# Patient Record
Sex: MALE | Race: WHITE | Employment: UNEMPLOYED | URBAN - METROPOLITAN AREA
[De-identification: names, ages, dates, MRNs, and addresses within clinical notes are randomized per-mention and may not be internally consistent; named-entity substitution may affect disease eponyms.]

---

## 2017-01-19 ENCOUNTER — OFFICE VISIT (OUTPATIENT)
Dept: FAMILY MEDICINE CLINIC | Age: 37
End: 2017-01-19

## 2017-01-19 VITALS
DIASTOLIC BLOOD PRESSURE: 85 MMHG | SYSTOLIC BLOOD PRESSURE: 127 MMHG | TEMPERATURE: 98.9 F | HEART RATE: 90 BPM | WEIGHT: 220 LBS | BODY MASS INDEX: 29.8 KG/M2 | HEIGHT: 72 IN | OXYGEN SATURATION: 98 % | RESPIRATION RATE: 12 BRPM

## 2017-01-19 DIAGNOSIS — E78.2 MIXED HYPERCHOLESTEROLEMIA AND HYPERTRIGLYCERIDEMIA: Primary | ICD-10-CM

## 2017-01-19 RX ORDER — DOXEPIN HYDROCHLORIDE 25 MG/1
CAPSULE ORAL
Status: ON HOLD | COMMUNITY
Start: 2017-01-09 | End: 2017-10-12

## 2017-01-19 RX ORDER — SIMVASTATIN 20 MG/1
20 TABLET, FILM COATED ORAL
Qty: 30 TAB | Refills: 2 | Status: ON HOLD | OUTPATIENT
Start: 2017-01-19 | End: 2017-10-12

## 2017-01-19 RX ORDER — DOXEPIN HYDROCHLORIDE 10 MG/1
CAPSULE ORAL
COMMUNITY
End: 2017-01-19 | Stop reason: DRUGHIGH

## 2017-01-19 NOTE — PROGRESS NOTES
Chief Complaint   Patient presents with   [address] Cholesterol Problem     he is a 31y.o. year old male who presents for evalution. Pt here for lab follow-up, had labs drawn by psychiatry and showed elevated cholesterol. Pt states diet is not very healthy, eats mostly cheesburgers, pizza, or pasta. Pt drinks mostly sweet tea or water. Does not exercise regularly. Reviewed PmHx, RxHx, FmHx, SocHx, AllgHx and updated and dated in the chart. Review of Systems - negative except as listed above in the HPI    Objective:     Vitals:    01/19/17 1453   BP: 127/85   Pulse: 90   Resp: 12   Temp: 98.9 °F (37.2 °C)   SpO2: 98%   Weight: 220 lb (99.8 kg)   Height: 6' (1.829 m)     Physical Examination: General appearance - alert, well appearing, and in no distress  Chest - clear to auscultation, no wheezes, rales or rhonchi, symmetric air entry  Heart - normal rate, regular rhythm, normal S1, S2, no murmurs, rubs, clicks or gallops    Assessment/ Plan:   Anel Bridges was seen today for labs and cholesterol problem. Diagnoses and all orders for this visit:    Mixed hypercholesterolemia and hypertriglyceridemia  -     simvastatin (ZOCOR) 20 mg tablet; Take 1 Tab by mouth nightly. Discussed need for low fat diet, rich in fruits and vegetables. Encouraged regular meals and daily exercise of at least 20 minutes. Reviewed sequela of high cholesterol on heart and brain health. Continue current medications. F/U 3 mo. Pt voiced understanding regarding plan of care. Follow-up Disposition:  Return in about 3 months (around 4/19/2017) for fasting appt cholesterol . I have discussed the diagnosis with the patient and the intended plan as seen in the above orders. The patient has received an after-visit summary and questions were answered concerning future plans.      Medication Side Effects and Warnings were discussed with patient    Nieves James NP

## 2017-01-19 NOTE — PATIENT INSTRUCTIONS
Hyperlipidemia: After Your Visit  Your Care Instructions  Hyperlipidemia is too much fat in your blood. The body has several kinds of fat, including cholesterol and triglycerides. Your body needs fat for many things, such as making new cells. But too much fat in your blood increases your chances of having a heart attack or stroke. You may be able to lower your cholesterol and triglycerides with a heart-healthy diet, exercise, and if needed, medicine. Your doctor may want you to try lifestyle changes first to see whether they lower the fat in your blood. You may need to take medicine if lifestyle changes do not lower the fat in your blood enough. Follow-up care is a key part of your treatment and safety. Be sure to make and go to all appointments, and call your doctor if you are having problems. Its also a good idea to know your test results and keep a list of the medicines you take. How can you care for yourself at home? Take your medicines  · Take your medicines exactly as prescribed. Call your doctor if you think you are having a problem with your medicine. · If you take medicine to lower your cholesterol, go to follow-up visits. You will need to have blood tests. · Do not take large doses of niacin, which is a B vitamin, while taking medicine called statins. It may increase the chance of muscle pain and liver problems. · Talk to your doctor about avoiding grapefruit juice if you are taking statins. Grapefruit juice can raise the level of this medicine in your blood. This could increase side effects. Eat more fruits, vegetables, and fiber  · Fruits and vegetables have lots of nutrients that help protect against heart disease, and they have little--if any--fat. Try to eat at least five servings a day. Dark green, deep orange, or yellow fruits and vegetables are healthy choices. · Keep carrots, celery, and other veggies handy for snacks.  Buy fruit that is in season and store it where you can see it so that you will be tempted to eat it. Cook dishes that have a lot of veggies in them, such as stir-fries and soups. · Foods high in fiber may reduce your cholesterol and provide important vitamins and minerals. High-fiber foods include whole-grain cereals and breads, oatmeal, beans, brown rice, citrus fruits, and apples. · Buy whole-grain breads and cereals instead of white bread and pastries. Limit saturated fat  · Read food labels and try to avoid saturated fat and trans fat. They increase your risk of heart disease. · Use olive or canola oil when you cook. Try cholesterol-lowering spreads, such as Benecol or Take Control. · Bake, broil, grill, or steam foods instead of frying them. · Limit the amount of high-fat meats you eat, including hot dogs and sausages. Cut out all visible fat when you prepare meat. · Eat fish, skinless poultry, and soy products such as tofu instead of high-fat meats. Soybeans may be especially good for your heart. Eat at least two servings of fish a week. Certain fish, such as salmon, contain omega-3 fatty acids, which may help reduce your risk of heart attack. · Choose low-fat or fat-free milk and dairy products. Get exercise, limit alcohol, and quit smoking  · Get more exercise. Work with your doctor to set up an exercise program. Even if you can do only a small amount, exercise will help you get stronger, have more energy, and manage your weight and your stress. Walking is an easy way to get exercise. Gradually increase the amount you walk every day. Aim for at least 30 minutes on most days of the week. You also may want to swim, bike, or do other activities. · Limit alcohol to no more than 2 drinks a day for men and 1 drink a day for women. · Do not smoke. If you need help quitting, talk to your doctor about stop-smoking programs and medicines. These can increase your chances of quitting for good. When should you call for help?   Call 911 anytime you think you may need emergency care. For example, call if:  · You have symptoms of a heart attack. These may include:  ¨ Chest pain or pressure, or a strange feeling in the chest.  ¨ Sweating. ¨ Shortness of breath. ¨ Nausea or vomiting. ¨ Pain, pressure, or a strange feeling in the back, neck, jaw, or upper belly or in one or both shoulders or arms. ¨ Lightheadedness or sudden weakness. ¨ A fast or irregular heartbeat. After you call 911, the  may tell you to chew 1 adult-strength or 2 to 4 low-dose aspirin. Wait for an ambulance. Do not try to drive yourself. · You have signs of a stroke. These may include:  ¨ Sudden numbness, paralysis, or weakness in your face, arm, or leg, especially on only one side of your body. ¨ New problems with walking or balance. ¨ Sudden vision changes. ¨ Drooling or slurred speech. ¨ New problems speaking or understanding simple statements, or feeling confused. ¨ A sudden, severe headache that is different from past headaches. · You passed out (lost consciousness). Call your doctor now or seek immediate medical care if:  · You have muscle pain or weakness. Watch closely for changes in your health, and be sure to contact your doctor if:  · You are very tired. · You have an upset stomach, gas, constipation, or belly pain or cramps. Where can you learn more? Go to [employer]  Enter C406 in the search box to learn more about \"Hyperlipidemia: After Your Visit. \"   © 5286-7736 Healthwise, Incorporated. Care instructions adapted under license by [employer] (which disclaims liability or warranty for this information). This care instruction is for use with your licensed healthcare professional. If you have questions about a medical condition or this instruction, always ask your healthcare professional. [place] [address] any warranty or liability for your use of this information.   Content Version: 1.9.613234; Last Revised: October 13, 2011

## 2017-01-19 NOTE — MR AVS SNAPSHOT
Visit Information Date & Time Provider Department Dept. Phone Encounter #  
 1/19/2017  2:45 PM Matthew Askew, [facility] [address] [facility] [phone] [id number] Follow-up Instructions Return in about 3 months (around 4/19/2017) for fasting appt cholesterol . Upcoming Health Maintenance Date Due Pneumococcal 19-64 Medium Risk (1 of 1 - PPSV23) 4/27/2001 DTaP/Tdap/Td series (1 - Tdap) 4/27/2003 INFLUENZA AGE 9 TO ADULT 8/1/2016 Allergies as of 1/19/2017  Review Complete On: 1/19/2017 By: Daniel Mendez LPN Severity Noted Reaction Type Reactions Bee Sting [Sting, Bee]  12/05/2016    Swelling Haldol [Haloperidol Lactate]  12/05/2016    Other (comments) Muscle spasms Pcn [Penicillins]  12/05/2016    Swelling Shellfish Derived  12/05/2016    Other (comments) Tingling in mouth Current Immunizations  Never Reviewed No immunizations on file. Not reviewed this visit You Were Diagnosed With   
  
 Codes Comments Mixed hypercholesterolemia and hypertriglyceridemia    -  Primary ICD-10-CM: J90.3 ICD-9-CM: 272.2 Vitals BP Pulse Temp Resp Height(growth percentile) Weight(growth percentile) 127/85 90 98.9 °F (37.2 °C) 12 6' (1.829 m) 220 lb (99.8 kg) SpO2 BMI Smoking Status 98% 29.84 kg/m2 Current Every Day Smoker Vitals History BMI and BSA Data Body Mass Index Body Surface Area  
 29.84 kg/m 2 2.25 m 2 Preferred Pharmacy Pharmacy Name Phone [id number], [address] [facility] [phone] Your Updated Medication List  
  
   
This list is accurate as of: 1/19/17  3:17 PM.  Always use your most recent med list.  
  
  
  
  
 doxepin 10 mg capsule Commonly known as:  SINEquan Take  by mouth nightly. melatonin 3 mg tablet Take  by mouth. * OLANZapine 10 mg tablet Commonly known as:  ZyPREXA Take 10 mg by mouth every morning. * OLANZapine 15 mg tablet Commonly known as:  ZyPREXA Take 15 mg by mouth nightly. simvastatin 20 mg tablet Commonly known as:  ZOCOR Take 1 Tab by mouth nightly. traZODone 100 mg tablet Commonly known as:  Anjel Oh Take 100 mg by mouth nightly. * Notice: This list has 2 medication(s) that are the same as other medications prescribed for you. Read the directions carefully, and ask your doctor or other care provider to review them with you. Prescriptions Sent to Pharmacy Refills  
 simvastatin (ZOCOR) 20 mg tablet 2 Sig: Take 1 Tab by mouth nightly. Class: Normal  
 Pharmacy: [id number] [place], [address] [facility] #: [phone] Route: Oral  
  
Follow-up Instructions Return in about 3 months (around 4/19/2017) for fasting appt cholesterol . Patient Instructions Hyperlipidemia: After Your Visit Your Care Instructions Hyperlipidemia is too much fat in your blood. The body has several kinds of fat, including cholesterol and triglycerides. Your body needs fat for many things, such as making new cells. But too much fat in your blood increases your chances of having a heart attack or stroke. You may be able to lower your cholesterol and triglycerides with a heart-healthy diet, exercise, and if needed, medicine. Your doctor may want you to try lifestyle changes first to see whether they lower the fat in your blood. You may need to take medicine if lifestyle changes do not lower the fat in your blood enough. Follow-up care is a key part of your treatment and safety. Be sure to make and go to all appointments, and call your doctor if you are having problems. Its also a good idea to know your test results and keep a list of the medicines you take. How can you care for yourself at home? Take your medicines · Take your medicines exactly as prescribed. Call your doctor if you think you are having a problem with your medicine. · If you take medicine to lower your cholesterol, go to follow-up visits. You will need to have blood tests. · Do not take large doses of niacin, which is a B vitamin, while taking medicine called statins. It may increase the chance of muscle pain and liver problems. · Talk to your doctor about avoiding grapefruit juice if you are taking statins. Grapefruit juice can raise the level of this medicine in your blood. This could increase side effects. Eat more fruits, vegetables, and fiber · Fruits and vegetables have lots of nutrients that help protect against heart disease, and they have littleif anyfat. Try to eat at least five servings a day. Dark green, deep orange, or yellow fruits and vegetables are healthy choices. · Keep carrots, celery, and other veggies handy for snacks. Buy fruit that is in season and store it where you can see it so that you will be tempted to eat it. Cook dishes that have a lot of veggies in them, such as stir-fries and soups. · Foods high in fiber may reduce your cholesterol and provide important vitamins and minerals. High-fiber foods include whole-grain cereals and breads, oatmeal, beans, brown rice, citrus fruits, and apples. · Buy whole-grain breads and cereals instead of white bread and pastries. Limit saturated fat · Read food labels and try to avoid saturated fat and trans fat. They increase your risk of heart disease. · Use olive or canola oil when you cook. Try cholesterol-lowering spreads, such as Benecol or Take Control. · Bake, broil, grill, or steam foods instead of frying them. · Limit the amount of high-fat meats you eat, including hot dogs and sausages. Cut out all visible fat when you prepare meat. · Eat fish, skinless poultry, and soy products such as tofu instead of high-fat meats. Soybeans may be especially good for your heart. Eat at least two servings of fish a week.  Certain fish, such as salmon, contain omega-3 fatty acids, which may help reduce your risk of heart attack. · Choose low-fat or fat-free milk and dairy products. Get exercise, limit alcohol, and quit smoking · Get more exercise. Work with your doctor to set up an exercise program. Even if you can do only a small amount, exercise will help you get stronger, have more energy, and manage your weight and your stress. Walking is an easy way to get exercise. Gradually increase the amount you walk every day. Aim for at least 30 minutes on most days of the week. You also may want to swim, bike, or do other activities. · Limit alcohol to no more than 2 drinks a day for men and 1 drink a day for women. · Do not smoke. If you need help quitting, talk to your doctor about stop-smoking programs and medicines. These can increase your chances of quitting for good. When should you call for help? Call 911 anytime you think you may need emergency care. For example, call if: 
· You have symptoms of a heart attack. These may include: ¨ Chest pain or pressure, or a strange feeling in the chest. 
¨ Sweating. ¨ Shortness of breath. ¨ Nausea or vomiting. ¨ Pain, pressure, or a strange feeling in the back, neck, jaw, or upper belly or in one or both shoulders or arms. ¨ Lightheadedness or sudden weakness. ¨ A fast or irregular heartbeat. After you call 911, the  may tell you to chew 1 adult-strength or 2 to 4 low-dose aspirin. Wait for an ambulance. Do not try to drive yourself. · You have signs of a stroke. These may include: 
¨ Sudden numbness, paralysis, or weakness in your face, arm, or leg, especially on only one side of your body. ¨ New problems with walking or balance. ¨ Sudden vision changes. ¨ Drooling or slurred speech. ¨ New problems speaking or understanding simple statements, or feeling confused. ¨ A sudden, severe headache that is different from past headaches. · You passed out (lost consciousness). Call your doctor now or seek immediate medical care if: 
· You have muscle pain or weakness. Watch closely for changes in your health, and be sure to contact your doctor if: 
· You are very tired. · You have an upset stomach, gas, constipation, or belly pain or cramps. Where can you learn more? Go to [employer] Enter C406 in the search box to learn more about \"Hyperlipidemia: After Your Visit. \"  
© 3435-7249 Healthwise, Incorporated. Care instructions adapted under license by [employer] (which disclaims liability or warranty for this information). This care instruction is for use with your licensed healthcare professional. If you have questions about a medical condition or this instruction, always ask your healthcare professional. [address] any warranty or liability for your use of this information. Content Version: 0.8.738655; Last Revised: October 13, 2011 Introducing [facility] SERVICES! [employer] introduces [employer] patient portal. Now you can access parts of your medical record, email your doctor's office, and request medication refills online. 1. In your internet browser, go to [email or web address] 2. Click on the First Time User? Click Here link in the Sign In box. You will see the New Member Sign Up page. 3. Enter your [employer] Access Code exactly as it appears below. You will not need to use this code after youve completed the sign-up process. If you do not sign up before the expiration date, you must request a new code. · [employer] Access Code: [email or web address] Expires: 3/5/2017  2:26 PM 
 
4. Enter the last four digits of your Social Security Number (xxxx) and Date of Birth (mm/dd/yyyy) as indicated and click Submit. You will be taken to the next sign-up page. 5. Create a [employer] ID. This will be your [employer] login ID and cannot be changed, so think of one that is secure and easy to remember. 6. Create a [employer] password. You can change your password at any time. 7. Enter your Password Reset Question and Answer. This can be used at a later time if you forget your password. 8. Enter your e-mail address. You will receive e-mail notification when new information is available in [address] 9. Click Sign Up. You can now view and download portions of your medical record. 10. Click the Download Summary menu link to download a portable copy of your medical information. If you have questions, please visit the Frequently Asked Questions section of the [employer] website. Remember, [employer] is NOT to be used for urgent needs. For medical emergencies, dial 911. Now available from your iPhone and Android! Please provide this summary of care documentation to your next provider. Your primary care clinician is listed as Gustavo Busby. If you have any questions after today's visit, please call [phone].

## 2017-10-11 ENCOUNTER — APPOINTMENT (OUTPATIENT)
Dept: GENERAL RADIOLOGY | Age: 37
DRG: 753 | End: 2017-10-11
Attending: PHYSICIAN ASSISTANT
Payer: MEDICAID

## 2017-10-11 ENCOUNTER — HOSPITAL ENCOUNTER (INPATIENT)
Age: 37
LOS: 23 days | Discharge: HOME | DRG: 753 | End: 2017-11-03
Attending: EMERGENCY MEDICINE | Admitting: PSYCHIATRY & NEUROLOGY
Payer: MEDICAID

## 2017-10-11 DIAGNOSIS — F25.9 SCHIZOAFFECTIVE DISORDER, UNSPECIFIED TYPE (HCC): Primary | ICD-10-CM

## 2017-10-11 LAB
ALBUMIN SERPL-MCNC: 3.5 G/DL (ref 3.5–5)
ALBUMIN/GLOB SERPL: 1 {RATIO} (ref 1.1–2.2)
ALP SERPL-CCNC: 81 U/L (ref 45–117)
ALT SERPL-CCNC: 30 U/L (ref 12–78)
AMPHET UR QL SCN: NEGATIVE
ANION GAP SERPL CALC-SCNC: 9 MMOL/L (ref 5–15)
APAP SERPL-MCNC: <2 UG/ML (ref 10–30)
APPEARANCE UR: CLEAR
AST SERPL-CCNC: 22 U/L (ref 15–37)
BACTERIA URNS QL MICRO: NEGATIVE /HPF
BARBITURATES UR QL SCN: NEGATIVE
BASOPHILS # BLD: 0.1 K/UL (ref 0–0.1)
BASOPHILS NFR BLD: 1 % (ref 0–1)
BENZODIAZ UR QL: NEGATIVE
BILIRUB SERPL-MCNC: 0.3 MG/DL (ref 0.2–1)
BILIRUB UR QL: NEGATIVE
BUN SERPL-MCNC: 15 MG/DL (ref 6–20)
BUN/CREAT SERPL: 16 (ref 12–20)
CALCIUM SERPL-MCNC: 8.5 MG/DL (ref 8.5–10.1)
CANNABINOIDS UR QL SCN: NEGATIVE
CHLORIDE SERPL-SCNC: 108 MMOL/L (ref 97–108)
CO2 SERPL-SCNC: 25 MMOL/L (ref 21–32)
COCAINE UR QL SCN: NEGATIVE
COLOR UR: NORMAL
CREAT SERPL-MCNC: 0.92 MG/DL (ref 0.7–1.3)
DRUG SCRN COMMENT,DRGCM: NORMAL
EOSINOPHIL # BLD: 0.3 K/UL (ref 0–0.4)
EOSINOPHIL NFR BLD: 2 % (ref 0–7)
EPITH CASTS URNS QL MICRO: NORMAL /LPF
ERYTHROCYTE [DISTWIDTH] IN BLOOD BY AUTOMATED COUNT: 13.5 % (ref 11.5–14.5)
ETHANOL SERPL-MCNC: <10 MG/DL
GLOBULIN SER CALC-MCNC: 3.5 G/DL (ref 2–4)
GLUCOSE SERPL-MCNC: 102 MG/DL (ref 65–100)
GLUCOSE UR STRIP.AUTO-MCNC: NEGATIVE MG/DL
HCT VFR BLD AUTO: 42.9 % (ref 36.6–50.3)
HGB BLD-MCNC: 15.2 G/DL (ref 12.1–17)
HGB UR QL STRIP: NEGATIVE
HYALINE CASTS URNS QL MICRO: NORMAL /LPF (ref 0–5)
KETONES UR QL STRIP.AUTO: NEGATIVE MG/DL
LEUKOCYTE ESTERASE UR QL STRIP.AUTO: NEGATIVE
LYMPHOCYTES # BLD: 1.9 K/UL (ref 0.8–3.5)
LYMPHOCYTES NFR BLD: 18 % (ref 12–49)
MCH RBC QN AUTO: 30.9 PG (ref 26–34)
MCHC RBC AUTO-ENTMCNC: 35.4 G/DL (ref 30–36.5)
MCV RBC AUTO: 87.2 FL (ref 80–99)
METHADONE UR QL: NEGATIVE
MONOCYTES # BLD: 0.8 K/UL (ref 0–1)
MONOCYTES NFR BLD: 7 % (ref 5–13)
NEUTS SEG # BLD: 8 K/UL (ref 1.8–8)
NEUTS SEG NFR BLD: 72 % (ref 32–75)
NITRITE UR QL STRIP.AUTO: NEGATIVE
OPIATES UR QL: NEGATIVE
PCP UR QL: NEGATIVE
PH UR STRIP: 6.5 [PH] (ref 5–8)
PLATELET # BLD AUTO: 222 K/UL (ref 150–400)
POTASSIUM SERPL-SCNC: 4 MMOL/L (ref 3.5–5.1)
PROT SERPL-MCNC: 7 G/DL (ref 6.4–8.2)
PROT UR STRIP-MCNC: NEGATIVE MG/DL
RBC # BLD AUTO: 4.92 M/UL (ref 4.1–5.7)
RBC #/AREA URNS HPF: NORMAL /HPF (ref 0–5)
SALICYLATES SERPL-MCNC: <1.7 MG/DL (ref 2.8–20)
SODIUM SERPL-SCNC: 142 MMOL/L (ref 136–145)
SP GR UR REFRACTOMETRY: 1.03 (ref 1–1.03)
UR CULT HOLD, URHOLD: NORMAL
UROBILINOGEN UR QL STRIP.AUTO: 1 EU/DL (ref 0.2–1)
WBC # BLD AUTO: 11 K/UL (ref 4.1–11.1)
WBC URNS QL MICRO: NORMAL /HPF (ref 0–4)

## 2017-10-11 PROCEDURE — 80307 DRUG TEST PRSMV CHEM ANLYZR: CPT | Performed by: PHYSICIAN ASSISTANT

## 2017-10-11 PROCEDURE — 85025 COMPLETE CBC W/AUTO DIFF WBC: CPT | Performed by: EMERGENCY MEDICINE

## 2017-10-11 PROCEDURE — 65220000003 HC RM SEMIPRIVATE PSYCH

## 2017-10-11 PROCEDURE — 80053 COMPREHEN METABOLIC PANEL: CPT | Performed by: EMERGENCY MEDICINE

## 2017-10-11 PROCEDURE — 73630 X-RAY EXAM OF FOOT: CPT

## 2017-10-11 PROCEDURE — 99284 EMERGENCY DEPT VISIT MOD MDM: CPT

## 2017-10-11 PROCEDURE — 36415 COLL VENOUS BLD VENIPUNCTURE: CPT | Performed by: EMERGENCY MEDICINE

## 2017-10-11 PROCEDURE — 80307 DRUG TEST PRSMV CHEM ANLYZR: CPT | Performed by: EMERGENCY MEDICINE

## 2017-10-11 PROCEDURE — 81001 URINALYSIS AUTO W/SCOPE: CPT | Performed by: EMERGENCY MEDICINE

## 2017-10-11 PROCEDURE — 74011250637 HC RX REV CODE- 250/637: Performed by: PHYSICIAN ASSISTANT

## 2017-10-11 RX ORDER — CEPHALEXIN 500 MG/1
500 CAPSULE ORAL
Status: COMPLETED | OUTPATIENT
Start: 2017-10-11 | End: 2017-10-11

## 2017-10-11 RX ADMIN — CEPHALEXIN 500 MG: 500 CAPSULE ORAL at 18:00

## 2017-10-11 NOTE — IP AVS SNAPSHOT
[address] [facility] [address] [place] [address] 
[phone] Patient: Hodan Gomez MRN: [id number] URE:8/78/6128 About your hospitalization You were admitted on:  October 11, 2017 You last received care in the:  [facility] You were discharged on:  November 3, 2017 Why you were hospitalized Your primary diagnosis was:  Schizoaffective Disorder (Hcc) Your diagnoses also included:  Aggression Things You Need To Do (next 8 weeks) Follow up with Pt. received fluphenazine decanoate IM injection - 50mg on 10/27 and 25mg on 11/1 Recommend maintanence dose of fluphenazine 75mg Follow up with Rosaline Jimenes NP Phone:  [phone] Where:  [address], [address] [facility], [id number] [place] [address] Friday Nov 03, 2017 Follow up with [address] [place] [address]  
your  will  and transport home at 11:00 Phone:  [phone] Where:  [address] [address] [address] Wednesday Nov 08, 2017 Follow up with [address] [place] [address]  
you have a 10:30 appointment with Dr. Marline Sainz   
  
Phone:  [phone] Where:  [facility] [address] [place] [address] Discharge Orders None A check santiago indicates which time of day the medication should be taken. My Medications TAKE these medications as instructed Instructions Each Dose to Equal  
 Morning Noon Evening Bedtime diphenhydrAMINE 25 mg capsule Commonly known as:  BENADRYL Your last dose was:  today Take 1 Cap by mouth two (2) times a day for 10 days. Indications: extrapyramidal disease 25 mg  
    
   
   
   
  
  
 OLANZapine 10 mg disintegrating tablet Commonly known as:  ZyPREXA zydis Your last dose was:  today Take 1 Tab by mouth three (3) times daily. Indications: Schizophrenia  10 mg  
    
   
   
  
   
  
  
 ASK your physician about these medications Instructions Each Dose to Equal  
 Morning Noon Evening Bedtime  
 fluPHENAZine decanoate 25 mg/mL injection Commonly known as:  PROLIXIN Ask about: Should I take this medication? Your last dose was:   Your next dose is: Follow-up with your psychiatrist for information on the next dose. 3 mL by IntraMUSCular route once for 1 dose. Indications: Psychotic Disorder 75 mg Where to Get Your Medications Information on where to get these meds will be given to you by the nurse or doctor. ! Ask your nurse or doctor about these medications diphenhydrAMINE 25 mg capsule  
 fluPHENAZine decanoate 25 mg/mL injection OLANZapine 10 mg disintegrating tablet Discharge Instructions DISCHARGE SUMMARY 
 
NAME:Jermain Lara : 1980 MRN: [id number] The patient Yesi Lucas exhibits the ability to control behavior in a less restrictive environment. Patient's level of functioning is improving. No assaultive/destructive behavior has been observed for the past 24 hours. No suicidal/homicidal threat or behavior has been observed for the past 24 hours. There is no evidence of serious medication side effects. Patient has not been in physical or protective restraints for at least the past 24 hours. If weapons involved, how are they secured? No weapons involved Is patient aware of and in agreement with discharge plan? Patient is aware of discharge and is in agreement. Arrangements for medication:  Prescriptions given to patient. Copy of discharge instructions to  provider?:  Yes, fax transition record to [phone] Arrangements for transportation home: Your  will  at 11:00 Keep all follow up appointments as scheduled, continue to take prescribed medications per physician instructions. Mental health crisis number:  048 or your local mental health crisis line number at [phone] DISCHARGE SUMMARY from Nurse PATIENT INSTRUCTIONS: 
 
 
What to do at Home: 
Recommended activity: Activity as tolerated If you experience any of the following symptoms thoughts of harming yourself or others, please follow up with  911 or your local mental health crisis line number at [phone] *  Please give a list of your current medications to your Primary Care Provider. *  Please update this list whenever your medications are discontinued, doses are 
    changed, or new medications (including over-the-counter products) are added. *  Please carry medication information at all times in case of emergency situations. These are general instructions for a healthy lifestyle: No smoking/ No tobacco products/ Avoid exposure to second hand smoke Surgeon General's Warning:  Quitting smoking now greatly reduces serious risk to your health. Obesity, smoking, and sedentary lifestyle greatly increases your risk for illness A healthy diet, regular physical exercise & weight monitoring are important for maintaining a healthy lifestyle You may be retaining fluid if you have a history of heart failure or if you experience any of the following symptoms:  Weight gain of 3 pounds or more overnight or 5 pounds in a week, increased swelling in our hands or feet or shortness of breath while lying flat in bed. Please call your doctor as soon as you notice any of these symptoms; do not wait until your next office visit. Recognize signs and symptoms of STROKE: 
 
F-face looks uneven A-arms unable to move or move unevenly S-speech slurred or non-existent T-time-call 911 as soon as signs and symptoms begin-DO NOT go Back to bed or wait to see if you get better-TIME IS BRAIN. Warning Signs of HEART ATTACK Call 911 if you have these symptoms: ? Chest discomfort. Most heart attacks involve discomfort in the center of the chest that lasts more than a few minutes, or that goes away and comes back. It can feel like uncomfortable pressure, squeezing, fullness, or pain. ? Discomfort in other areas of the upper body. Symptoms can include pain or discomfort in one or both arms, the back, neck, jaw, or stomach. ? Shortness of breath with or without chest discomfort. ? Other signs may include breaking out in a cold sweat, nausea, or lightheadedness. Don't wait more than five minutes to call [address]! Fast action can save your life. Calling 911 is almost always the fastest way to get lifesaving treatment. Emergency Medical Services staff can begin treatment when they arrive  up to an hour sooner than if someone gets to the hospital by car. The discharge information has been reviewed with the patient. The patient verbalized understanding. Discharge medications reviewed with the patient and appropriate educational materials and side effects teaching were provided. ___________________________________________________________________________________________________________________________________ [employer] Announcement We are excited to announce that we are making your provider's discharge notes available to you in [employer]. You will see these notes when they are completed and signed by the physician that discharged you from your recent hospital stay. If you have any questions or concerns about any information you see in [employer], please call the Health Information Department where you were seen or reach out to your Primary Care Provider for more information about your plan of care. Introducing [facility]! Twila Platt introduces [employer]2Manage patient portal. Now you can access parts of your medical record, email your doctor's office, and request medication refills online.    
 
1. In your internet browser, go to [email or web address] 2. Click on the First Time User? Click Here link in the Sign In box. You will see the New Member Sign Up page. 3. Enter your Global Active Access Code exactly as it appears below. You will not need to use this code after youve completed the sign-up process. If you do not sign up before the expiration date, you must request a new code. · Global Active Access Code: [email or web address] Expires: 2/1/2018  8:24 AM 
 
4. Enter the last four digits of your Social Security Number (xxxx) and Date of Birth (mm/dd/yyyy) as indicated and click Submit. You will be taken to the next sign-up page. 5. Create a Bdayt ID. This will be your Global Active login ID and cannot be changed, so think of one that is secure and easy to remember. 6. Create a Global Active password. You can change your password at any time. 7. Enter your Password Reset Question and Answer. This can be used at a later time if you forget your password. 8. Enter your e-mail address. You will receive e-mail notification when new information is available in [address] 9. Click Sign Up. You can now view and download portions of your medical record. 10. Click the Download Summary menu link to download a portable copy of your medical information. If you have questions, please visit the Frequently Asked Questions section of the Global Active website. Remember, Global Active is NOT to be used for urgent needs. For medical emergencies, dial 911. Now available from your iPhone and Android! Providers Seen During Your Hospitalization Provider Specialty Primary office phone Pete Wise MD Emergency Medicine [phone] Laurel Iyer MD Psychiatry [phone] Your Primary Care Physician (PCP) Primary Care Physician Office Phone Office Fax Pilar Sellers [phone] You are allergic to the following Allergen Reactions Bee Sting (Sting, Bee) Swelling Haldol (Haloperidol Lactate) Other (comments) Muscle spasms Pcn (Penicillins) Swelling Shellfish Derived Other (comments) Tingling in mouth Recent Documentation Height Weight BMI Smoking Status 1.829 m 96.3 kg 28.79 kg/m2 Current Every Day Smoker Emergency Contacts Name Discharge Info Relation Home Work Mobile Emilie MANUEL N/A  AT THIS TIME [6] Parent [1] [phone] HardeepAlvai N/A  AT THIS TIME [6] Parent [1] [phone] Patient Belongings The following personal items are in your possession at time of discharge: 
  Dental Appliances: None  Visual Aid: None      Home Medications: None   Jewelry: None  Clothing: Footwear, Undergarments, Pants, Shirt Please provide this summary of care documentation to your next provider. Signatures-by signing, you are acknowledging that this After Visit Summary has been reviewed with you and you have received a copy. Patient Signature:  ____________________________________________________________ Date:  ____________________________________________________________  
  
Franki Connor Provider Signature:  ____________________________________________________________ Date:  ____________________________________________________________

## 2017-10-11 NOTE — IP AVS SNAPSHOT
[address] 
[phone] Patient: Rehana Ramirez MRN: [id number] ETT:6/48/1772 My Medications TAKE these medications as instructed Instructions Each Dose to Equal  
 Morning Noon Evening Bedtime diphenhydrAMINE 25 mg capsule Commonly known as:  BENADRYL Your last dose was:  today Take 1 Cap by mouth two (2) times a day for 10 days. Indications: extrapyramidal disease 25 mg  
    
   
   
   
  
  
 OLANZapine 10 mg disintegrating tablet Commonly known as:  ZyPREXA zydis Your last dose was:  today Take 1 Tab by mouth three (3) times daily. Indications: Schizophrenia 10 mg ASK your physician about these medications Instructions Each Dose to Equal  
 Morning Noon Evening Bedtime  
 fluPHENAZine decanoate 25 mg/mL injection Commonly known as:  PROLIXIN Ask about: Should I take this medication? Your last dose was:  11/1 Your next dose is: Follow-up with your psychiatrist for information on the next dose. 3 mL by IntraMUSCular route once for 1 dose. Indications: Psychotic Disorder 75 mg Where to Get Your Medications Information on where to get these meds will be given to you by the nurse or doctor. ! Ask your nurse or doctor about these medications diphenhydrAMINE 25 mg capsule  
 fluPHENAZine decanoate 25 mg/mL injection OLANZapine 10 mg disintegrating tablet

## 2017-10-11 NOTE — ED PROVIDER NOTES
HPI Comments: 29 y/o male with PMHx of schizoaffective disorder, asthma and depression, presenting with law enforcement under TDO with complaint of mental health problem. The patient is a poor historian, not cooperating with interview. He does state that his feet have been hurting him for \"a long time,\" is not sure if he fell or twisted his ankles. In response to question of SI/HI, patient will only state \"I like myself. \" He denies substance use or taking any prescribed medications. Law enforcement officers state that they were contacted by the patient's father, who was concerned that the patient was behaving erratically and did not seem to be showering or taking care of himself. The history is provided by the patient and the police. Past Medical History:   Diagnosis Date    Asthma     Depression     Schizoaffective disorder ([facility] [address]        Past Surgical History:   Procedure Laterality Date    HX APPENDECTOMY      HX WISDOM TEETH EXTRACTION           Family History:   Problem Relation Age of Onset    Heart Disease Father     Hypertension Father     Stroke Father     Kidney Disease Father        Social History     Social History    Marital status: SINGLE     Spouse name: N/A    Number of children: N/A    Years of education: N/A     Occupational History    Not on file. Social History Main Topics    Smoking status: Current Every Day Smoker     Packs/day: 0.50     Years: 15.00    Smokeless tobacco: Never Used    Alcohol use 0.6 oz/week     1 Cans of beer per week    Drug use: No    Sexual activity: Yes     Birth control/ protection: Condom     Other Topics Concern    Not on file     Social History Narrative         ALLERGIES: Bee sting [sting, bee]; Haldol [haloperidol lactate];  Pcn [penicillins]; and Shellfish derived    Review of Systems   Unable to perform ROS: Psychiatric disorder       Vitals:    10/11/17 1543   BP: 127/77   Pulse: (!) 104   Resp: 20   Temp: 98.6 °F (37 °C)   SpO2: 96%   Weight: 99.8 kg (220 lb)   Height: 6' (1.829 m)            Physical Exam   Constitutional: He is oriented to person, place, and time. He appears well-developed. No distress. Patient appears slightly disheveled. HENT:   Head: Normocephalic and atraumatic. Eyes: Conjunctivae and EOM are normal.   Neck: Normal range of motion. Neck supple. Cardiovascular: Normal rate, regular rhythm and normal heart sounds. Pulmonary/Chest: Effort normal and breath sounds normal.   Musculoskeletal:   Left foot with significant generalized swelling, erythema and tenderness to palpation. Right foot with mild generalized swelling, erythema and tenderness to palpation. Bilateral ankles mildly tender, but no appreciable swelling, erythema or deformity. Neurological: He is alert and oriented to person, place, and time. Skin: Skin is warm and dry. He is not diaphoretic. Psychiatric:   Intermittently mumbling to himself, reluctant to answer questions. Nursing note and vitals reviewed. MDM  Number of Diagnoses or Management Options  Schizoaffective disorder, unspecified type [facility]):      Amount and/or Complexity of Data Reviewed  Clinical lab tests: ordered and reviewed  Tests in the radiology section of CPT®: ordered and reviewed  Discuss the patient with other providers: yes (Dr. Hafsa Bridges, ED attending)  Independent visualization of images, tracings, or specimens: yes (XR right/left foot)    Patient Progress  Patient progress: stable    ED Course       Procedures    31 y/o male with PMHx of schizoaffective disorder, asthma and depression, presenting with law enforcement under TDO with complaint of mental health problem. Patient has bed assigned in mental health unit, presented to the ED for medical clearance. History and exam as above. Will obtain XR bilateral feet, CBC, CMP, ethanol level, acetaminophen level, salicylate level, UDS and UA. Labs are unremarkable.  XR bilateral feet, read by radiology and independently visualized and interpreted by myself, reveal no evidence of acute fractures or traumatic malalignment. Will cover for cellulitis with Keflex 500mg TID x7 days. Patient medically clear for inpatient psychiatric admission, admitted in stable condition.

## 2017-10-11 NOTE — ED NOTES
Pt. resting quietly with police in room, shackles remain in place, skin remains without change. No changes to assessment.

## 2017-10-11 NOTE — ED TRIAGE NOTES
Triage:  Pt to ED escorted by [facility] under a TDO. Pt was transported to ED for admission to mental health services. Informed by [employer] that per parents family was not action normal, Pt acting irratically such as not showering and not interacting in a normal social pattern. [employer] denies any aggressive actions toward other. Pt arrives in custody of [employer], Pt in both upper and lower shackles. Pt to the ED for medical clearance, Pt has assigned bed up in mental health unit.

## 2017-10-11 NOTE — BH NOTES
TRANSFER - IN REPORT:    Verbal report received from Eliud pressley on Colville Harp  being received from ED for routine progression of care      Report consisted of patients Situation, Background, Assessment and   Recommendations(SBAR). Information from the following report(s) SBAR was reviewed with the receiving nurse. Opportunity for questions and clarification was provided. Assessment completed upon patients arrival to unit and care assumed.

## 2017-10-11 NOTE — ED NOTES
Pt. took [place] without issue. Not talking to RN but mumbling to self. Remains in handcuff/shackles with police present. Skin without change.

## 2017-10-11 NOTE — ROUTINE PROCESS
TRANSFER - OUT REPORT:    Verbal report given to Howieenia (name) on Larinda Lennox  being transferred to [facility](unit) for routine progression of care       Report consisted of patients Situation, Background, Assessment and   Recommendations(SBAR). Information from the following report(s) SBAR was reviewed with the receiving nurse. Lines:       Opportunity for questions and clarification was provided.       Patient transported with:   Escorted by police

## 2017-10-12 PROBLEM — R46.89 AGGRESSION: Status: ACTIVE | Noted: 2017-10-12

## 2017-10-12 PROCEDURE — 74011250637 HC RX REV CODE- 250/637: Performed by: PSYCHIATRY & NEUROLOGY

## 2017-10-12 PROCEDURE — 65220000003 HC RM SEMIPRIVATE PSYCH

## 2017-10-12 RX ORDER — BENZTROPINE MESYLATE 2 MG/1
2 TABLET ORAL
Status: DISCONTINUED | OUTPATIENT
Start: 2017-10-12 | End: 2017-10-12 | Stop reason: SDUPTHER

## 2017-10-12 RX ORDER — HYDROXYZINE PAMOATE 50 MG/1
50 CAPSULE ORAL
Status: ON HOLD | COMMUNITY
End: 2017-10-12

## 2017-10-12 RX ORDER — IBUPROFEN 200 MG
1 TABLET ORAL
Status: DISCONTINUED | OUTPATIENT
Start: 2017-10-12 | End: 2017-11-03 | Stop reason: HOSPADM

## 2017-10-12 RX ORDER — DIPHENHYDRAMINE HYDROCHLORIDE 50 MG/ML
50 INJECTION, SOLUTION INTRAMUSCULAR; INTRAVENOUS
Status: DISCONTINUED | OUTPATIENT
Start: 2017-10-12 | End: 2017-11-03 | Stop reason: HOSPADM

## 2017-10-12 RX ORDER — DIPHENHYDRAMINE HCL 25 MG
50 CAPSULE ORAL 2 TIMES DAILY
Status: DISCONTINUED | OUTPATIENT
Start: 2017-10-12 | End: 2017-10-16

## 2017-10-12 RX ORDER — FLUPHENAZINE HYDROCHLORIDE 5 MG/ML
10 SOLUTION, CONCENTRATE ORAL 2 TIMES DAILY
Status: DISCONTINUED | OUTPATIENT
Start: 2017-10-12 | End: 2017-10-15

## 2017-10-12 RX ORDER — LORAZEPAM 1 MG/1
1 TABLET ORAL
Status: DISCONTINUED | OUTPATIENT
Start: 2017-10-12 | End: 2017-10-30

## 2017-10-12 RX ORDER — FLUPHENAZINE DECANOATE 25 MG/ML
12.5 INJECTION, SOLUTION INTRAMUSCULAR; SUBCUTANEOUS
Status: DISCONTINUED | OUTPATIENT
Start: 2017-10-12 | End: 2017-10-12 | Stop reason: CLARIF

## 2017-10-12 RX ORDER — FLUPHENAZINE HYDROCHLORIDE 2.5 MG/ML
5 INJECTION, SOLUTION INTRAMUSCULAR
Status: DISCONTINUED | OUTPATIENT
Start: 2017-10-12 | End: 2017-11-03 | Stop reason: HOSPADM

## 2017-10-12 RX ORDER — FLUPHENAZINE HYDROCHLORIDE 1 MG/1
1 TABLET ORAL
Status: ON HOLD | COMMUNITY
End: 2017-10-12

## 2017-10-12 RX ORDER — IBUPROFEN 400 MG/1
400 TABLET ORAL
Status: DISCONTINUED | OUTPATIENT
Start: 2017-10-12 | End: 2017-11-03 | Stop reason: HOSPADM

## 2017-10-12 RX ORDER — ADHESIVE BANDAGE
30 BANDAGE TOPICAL DAILY PRN
Status: DISCONTINUED | OUTPATIENT
Start: 2017-10-12 | End: 2017-11-03 | Stop reason: HOSPADM

## 2017-10-12 RX ORDER — ACETAMINOPHEN 325 MG/1
650 TABLET ORAL
Status: DISCONTINUED | OUTPATIENT
Start: 2017-10-12 | End: 2017-11-03 | Stop reason: HOSPADM

## 2017-10-12 RX ORDER — VALPROIC ACID 250 MG/5ML
500 SOLUTION ORAL 2 TIMES DAILY
Status: DISCONTINUED | OUTPATIENT
Start: 2017-10-12 | End: 2017-10-16

## 2017-10-12 RX ORDER — MIRTAZAPINE 15 MG/1
15 TABLET, FILM COATED ORAL
Status: ON HOLD | COMMUNITY
End: 2017-10-12

## 2017-10-12 RX ORDER — BENZTROPINE MESYLATE 1 MG/ML
2 INJECTION INTRAMUSCULAR; INTRAVENOUS
Status: DISCONTINUED | OUTPATIENT
Start: 2017-10-12 | End: 2017-10-12 | Stop reason: SDUPTHER

## 2017-10-12 RX ORDER — LORAZEPAM 2 MG/ML
2 INJECTION INTRAMUSCULAR
Status: DISCONTINUED | OUTPATIENT
Start: 2017-10-12 | End: 2017-11-03 | Stop reason: HOSPADM

## 2017-10-12 RX ORDER — ZIPRASIDONE HYDROCHLORIDE 80 MG/1
80 CAPSULE ORAL 2 TIMES DAILY WITH MEALS
Status: ON HOLD | COMMUNITY
End: 2017-10-12

## 2017-10-12 RX ORDER — OLANZAPINE 5 MG/1
5 TABLET ORAL
Status: DISCONTINUED | OUTPATIENT
Start: 2017-10-12 | End: 2017-11-03 | Stop reason: HOSPADM

## 2017-10-12 RX ORDER — ZOLPIDEM TARTRATE 10 MG/1
10 TABLET ORAL
Status: DISCONTINUED | OUTPATIENT
Start: 2017-10-12 | End: 2017-11-03 | Stop reason: HOSPADM

## 2017-10-12 RX ADMIN — FLUPHENAZINE HYDROCHLORIDE 10 MG: 5 SOLUTION, CONCENTRATE ORAL at 11:00

## 2017-10-12 RX ADMIN — DIPHENHYDRAMINE HYDROCHLORIDE 50 MG: 25 CAPSULE ORAL at 11:00

## 2017-10-12 NOTE — BH NOTES
Second Opinion (regarding): Capacity to refuse medications    Reason for Admission:  Jessica Arizmendi was admitted for severe psychosis and rosina posing a risk of harm to himself and others. Diagnosis: Schizoaffective disorder ([address]    The patient does not recognize or acknowledge that he has a mental illness requiring medications to stabilize his mental status. The patient does not recognize the dangers of not taking the recommended psychiatric medications. He continues to refuse medications. Determination: Based on my independent evaluation of the patient on 10/12/2017, the patient does not have the capacity to refuse the psychiatric medications currently recommended for his illness. Recommendation: Referral to [facility] for Treatment Over Objection order. The patient does not have a surrogate decision maker or guardian to make these decisions on his behalf.       Signed By:   Harjit Irizarry MD  10/12/2017
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
## 2017-10-12 NOTE — INTERDISCIPLINARY ROUNDS
Behavioral Health Interdisciplinary Rounds     Patient Name: Annmarie Wilson  Age: 30 y.o. Room/Bed:  730/  Primary Diagnosis: <principal problem not specified>   Admission Status: TDO     Readmission within 30 days: no  Power of  in place: no  Patient requires a blocked bed: Yes          Reason for blocked bed: Aggressive Behavior    VTE Prophylaxis: Not indicated  Flu vaccine given : no   Mobility needs/Fall risk: no    Nutritional Plan: no  Consults:          Labs/Testing due today?: no    Sleep hours: 8.25+      Participation in Care/Groups:  No - New Admission  Medication Compliant?: New Admission - No meds ordered at this time  PRNS (last 24 hours): None    Restraints (last 24 hours):  no  Substance Abuse:  no  CIWA (range last 24 hours):  COWS (range last 24 hours):   Alcohol screening (AUDIT) completed -  AUDIT Score: 0  If applicable, date SBIRT discussed in treatment team AND documented:   Tobacco - patient is a smoker: yes   Date tobacco education completed by RN :   10/11/17  24 hour chart check complete: yes     Patient goal(s) for today:   Treatment team focus/goals: Plan to set up his hearing and start medications.   Progress note: He was psychotic and unable to participate in treatment team.  He was also aggressive and threatening     LOS:  1  Expected LOS: TBD     Financial concerns/prescription coverage: no issues   Date of last family contact:       Family requesting physician contact today:    Discharge plan:he will return home with family    Guns in the home: no        Outpatient provider(s): [facility]     Participating treatment team members: Annmarie Katie, Roxy Payton, RN

## 2017-10-12 NOTE — PROGRESS NOTES
Problem: Psychosis  Goal: *STG: Patient will develop strategies to regulate emotions and corresponding behaviors  Outcome: Not Progressing Towards Goal  Expressed anger by drawing fist back and threatening treatment team members. Was redirectable to leave room. Discussed with treatment blocked room status and this status will continue per MD and treatment team input.
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
## 2017-10-12 NOTE — PROGRESS NOTES
Problem: Psychosis  Goal: *STG: Remains safe in hospital  Patient is resting quietly in bed. No respiratory distress noted.   Patient remains safe in hospital.

## 2017-10-12 NOTE — PROGRESS NOTES
Problem: Psychosis  Goal: *STG: Remains safe in hospital  Outcome: Progressing Towards Goal  Pt in bed asleep at this time. NAD. Has remained safe since admission to unit. No self- injurious behaviors/harm to others noted and or reported. Will continue to assess & monitor. Q 15 minute checks for safety continues.

## 2017-10-12 NOTE — PROGRESS NOTES
Problem: Psychosis   Goal: *STG/LTG: Complies with medication therapy  Outcome: Not progressing towards goal  Refused lab draws.
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
## 2017-10-12 NOTE — BH NOTES
GROUP THERAPY PROGRESS NOTE    Guevara Jad did not participate in a Process Group on the Acute Unit with a focus identifying feelings, planning for the day, and reviewing DBT coping skills related to distress management.
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
## 2017-10-12 NOTE — PROGRESS NOTES
Admission Medication Reconciliation:    Information obtained from:  communication with patients pharmacy ([address] [place] [address] Drive [phone]) and records from [address] [place] [address]    Comments/Recommendations: Updated PTA meds/reviewed patient's allergies. 1)  Per pharmacy: patient has not filled any medications since 6/2017 (ibuprofen and hydrocortisone suppositories)    2)  Psychiatric medication history per pharmacy records:       - 12/2016: trazodone 100mg nightly        - 12/2016: melatonin 3mg nightly        - 1/2017: doxepin 25mg (unknown directions)        - 2/2017-4/2017 (filled): quetiapine (up to) 300mg QHS       - 3/2017 (NOT filled): olanzapine 15mg/day        - 4/2017 (last filled) ziprasidone 80mg BID                    - Prescriptions from 5/2017 and 6/2017 put ON HOLD- not filled       - 5/2017 (last filled): fluphenazine 1mg Q6H PRN agitation                   - Has refills but never filled again       - 5/2017 (last filled): mirtazapine 15mg QHS                   - Has refills but never filled again       - 5/2017 (last filled): hydroxyzine 50mg Q6H PRN anxiety                   - Has refills but never filled again    3)  All psychiatric medications were written by the same physician, Dr. Lia Smith    (sp?)    4)  The following changes were made to PTA medication list:       - Removed: doxepin, flu vaccine, melatonin, olanzapine, simvastatin, trazodone       Significant PMH/Disease States:   Past Medical History:   Diagnosis Date    Asthma     Depression     Schizoaffective disorder [facility])      Chief Complaint for this Admission:    Chief Complaint   Patient presents with    Mental Health Problem     Allergies:  Bee sting [sting, bee]; Haldol [haloperidol lactate];  Pcn [penicillins]; and Shellfish derived    Prior to Admission Medications:   None

## 2017-10-13 LAB
CHOLEST SERPL-MCNC: 199 MG/DL
GLUCOSE P FAST SERPL-MCNC: 100 MG/DL (ref 65–100)
HDLC SERPL-MCNC: 42 MG/DL
HDLC SERPL: 4.7 {RATIO} (ref 0–5)
LDLC SERPL CALC-MCNC: 133 MG/DL (ref 0–100)
LIPID PROFILE,FLP: ABNORMAL
TRIGL SERPL-MCNC: 120 MG/DL (ref ?–150)
TSH SERPL DL<=0.05 MIU/L-ACNC: 4.1 UIU/ML (ref 0.36–3.74)
VLDLC SERPL CALC-MCNC: 24 MG/DL

## 2017-10-13 PROCEDURE — 80061 LIPID PANEL: CPT | Performed by: PSYCHIATRY & NEUROLOGY

## 2017-10-13 PROCEDURE — 74011250637 HC RX REV CODE- 250/637: Performed by: PSYCHIATRY & NEUROLOGY

## 2017-10-13 PROCEDURE — 84443 ASSAY THYROID STIM HORMONE: CPT | Performed by: PSYCHIATRY & NEUROLOGY

## 2017-10-13 PROCEDURE — 82947 ASSAY GLUCOSE BLOOD QUANT: CPT | Performed by: PSYCHIATRY & NEUROLOGY

## 2017-10-13 PROCEDURE — 36415 COLL VENOUS BLD VENIPUNCTURE: CPT | Performed by: PSYCHIATRY & NEUROLOGY

## 2017-10-13 PROCEDURE — 65220000003 HC RM SEMIPRIVATE PSYCH

## 2017-10-13 RX ADMIN — IBUPROFEN 400 MG: 400 TABLET, FILM COATED ORAL at 07:37

## 2017-10-13 RX ADMIN — DIPHENHYDRAMINE HYDROCHLORIDE 50 MG: 25 CAPSULE ORAL at 07:32

## 2017-10-13 NOTE — PROGRESS NOTES
Problem: Falls - Risk of  Goal: *Absence of Falls  Document Kedar Fall Risk and appropriate interventions in the flowsheet. Outcome: Progressing Towards Goal  Fall Risk Interventions:     Pt received in bed asleep. NAD. No falls noted/reported. Q 15 minute checks for safety maintained.

## 2017-10-13 NOTE — INTERDISCIPLINARY ROUNDS
Behavioral Health Interdisciplinary Rounds     Patient Name: Tez Hernandez  Age: 30 y.o. Room/Bed:  730/01  Primary Diagnosis: Schizoaffective disorder ([facility] [address]   Admission Status: Involuntary Commitment and Forced Medication Order     Readmission within 30 days: no  Power of  in place: no  Patient requires a blocked bed: yes          Reason for blocked bed: Aggressive Behavior    VTE Prophylaxis: Not indicated  Flu vaccine given : no   Mobility needs/Fall risk: no    Nutritional Plan: no  Consults:          Labs/Testing due today?: yes    Sleep hours: 8.25        Participation in Care/Groups:  Yes - Reflections Group  Medication Compliant?: Selective - Refused all Evening Meds  PRNS (last 24 hours): None    Restraints (last 24 hours):  no  Substance Abuse:  no  CIWA (range last 24 hours):  COWS (range last 24 hours):   Alcohol screening (AUDIT) completed -  AUDIT Score: 0  If applicable, date SBIRT discussed in treatment team AND documented:   Tobacco - patient is a smoker: yes   Date tobacco education completed by RN: 10/11/17  24 hour chart check complete: yes     Patient goal(s) for today:   Treatment team focus/goals: Plan to link his medications and titrate his medications. Progress note He remains bizarre. He is non compliant with his medications. He was seen by the  today and was committed and medications were forced. LOS:  2  Expected LOS: TBD     Financial concerns/prescription coverage: no issues    Date of last family contact:       Family requesting physician contact today:    Discharge plan: he will return home with his parents   Guns in the home: no        Outpatient provider(s): [facility] ICT team     Participating treatment team members: Mildred Grant, RN - Malena Smith , PharmD.

## 2017-10-13 NOTE — BH NOTES
GROUP THERAPY PROGRESS NOTE    Chad Ramos did not participate in the Acute Unit's Process Group, with a focus identifying feelings, planning for the rest of the day, and discussing discharge.

## 2017-10-13 NOTE — BH NOTES
GROUP THERAPY PROGRESS NOTE    Horris Heimlich is participating in Reflections. Group time: 10 minutes    Personal goal for participation: unit rules    Goal orientation: personal    Group therapy participation: minimal    Therapeutic interventions reviewed and discussed: yes    Impression of participation: Quiet in group. Voiced no complaints. Patient in and out of his room during shift, to himself with little spontaneous interaction with others.

## 2017-10-13 NOTE — PROGRESS NOTES
Problem: Psychosis  Goal: *STG: Decreased delusional thinking  Outcome: Not Progressing Towards Goal  Pt is hypervigilant, standing in the hallway, staring at staff, as staff attempts to make conversation with him. Pt is selectively mute, offering no disclosures. Goal: *STG/LTG: Complies with medication therapy  Outcome: Not Progressing Towards Goal  Pt has been noncompliant with meds, all day.

## 2017-10-13 NOTE — PROGRESS NOTES
Problem: Psychosis  Goal: *STG: Remains safe in hospital  Outcome: Progressing Towards Goal  Pt denies any suicidal or homicidal thoughts. Contracts for safety. Remains on q 15 min safety checks.

## 2017-10-13 NOTE — PROGRESS NOTES
Problem: Psychosis  Goal: *STG: Decreased hallucinations  Outcome: Progressing Towards Goal  Denies AH/VH but appears to be responding to internal stimuli. Goal: *STG: Decreased delusional thinking  Outcome: Not Progressing Towards Goal  Jose stated \"this place really isn't real.These people are in the [id number]"  Goal: *STG: Remains safe in hospital  Outcome: Progressing Towards Goal  Pt denies any suicidal or homicidal thoughts. Contracts for safety. Remains on q 15 min safety checks. Goal: *STG/LTG: Complies with medication therapy  Outcome: Not Progressing Towards Goal  Has been selectively compliant. Court ordered medication enacted this am.  Goal: *STG/LTG: Demonstrates improved thought patterns as evidenced by logical and coherent speech  Outcome: Not Progressing Towards Goal  Thoughts appear to be disorganized with thought blocking.

## 2017-10-14 PROCEDURE — 65220000003 HC RM SEMIPRIVATE PSYCH

## 2017-10-14 PROCEDURE — 74011250637 HC RX REV CODE- 250/637: Performed by: PSYCHIATRY & NEUROLOGY

## 2017-10-14 RX ADMIN — IBUPROFEN 400 MG: 400 TABLET, FILM COATED ORAL at 21:20

## 2017-10-14 RX ADMIN — DIPHENHYDRAMINE HYDROCHLORIDE 50 MG: 25 CAPSULE ORAL at 08:15

## 2017-10-14 NOTE — INTERDISCIPLINARY ROUNDS
Behavioral Health Interdisciplinary Rounds     Patient Name: Lizy Johnson  Age: 30 y.o.   Room/Bed:  730/  Primary Diagnosis: Schizoaffective disorder (HCC)   Admission Status: Involuntary Commitment and Forced Medication Order     Readmission within 30 days: no  Power of  in place: no  Patient requires a blocked bed: yes          Reason for blocked bed: aggressive behavior    VTE Prophylaxis: Not indicated  Flu vaccine given : no - pt refused   Mobility needs/Fall risk: no    Nutritional Plan: no  Consults:          Labs/Testing due today?: no    Sleep hours:    7 hrs      Participation in Care/Groups:  no  Medication Compliant?: Selective  PRNS (last 24 hours): Pain    Restraints (last 24 hours):  no  Substance Abuse:  no  CIWA (range last 24 hours):  COWS (range last 24 hours):   Alcohol screening (AUDIT) completed -  AUDIT Score: 0  If applicable, date SBIRT discussed in treatment team AND documented:   Tobacco - patient is a smoker: yes   Date tobacco education completed by RN: 10/11/2017  24 hour chart check complete: yes     Patient goal(s) for today: Meet w/ Tx and comply taking  with meds and unit activities  Treatment team focus/goals: Eval meds for improved effectiveness  Progress note : Pacing unable to engage team     LOS:  3  Expected LOS:     Financial concerns/prescription coverage:    Date of last family contact:       Family requesting physician contact today:    Discharge plan: Return home  Guns in the home:  No     Outpatient provider(s): [facility] [address] [place] [address] Team     Participating treatment team members: Lizy Johnson, Dr Shelia Alvarez RN and Eleanor Reis

## 2017-10-14 NOTE — BH NOTES
GROUP THERAPY PROGRESS NOTE    The patient Aissatou Levine is participating in [employer]. Group time: 30 minutes    Personal goal for participation: to orient the patient to the unit.     Goal orientation: successful adoption of unit rules    Group therapy participation: active    Therapeutic interventions reviewed and discussed: Yes    Impression of participation:     Rasheeda Doctor  10/14/2017 9:58 AM

## 2017-10-14 NOTE — PROGRESS NOTES
Problem: Psychosis  Goal: *STG: Decreased hallucinations  Variance: Patient Condition  Pt. Met in treatment team with Dr. Kevin Pereyra inappropriately     Responding to internal stimuli  Poor hygiene  Pushing doors        Goal: *STG/LTG: Complies with medication therapy  Pt. On court ordered medications  Refusing everything  except benadryl   Goal: *STG/LTG: Demonstrates improved thought patterns as evidenced by logical and coherent speech  Variance: Patient Condition  Pt. Pacing on unit restless pushing on doors   Unable to socialize with peers  Goal: Interventions  Will continue to monitor   On 15 min. Checks for safety   Assess thought process   Hallucinations  Medication compliance effectiveness   Encourage groups     Problem: Falls - Risk of  Goal: *Absence of Falls  Document Kedar Fall Risk and appropriate interventions in the flowsheet.    Fall Risk Interventions:  Non slip socks   Bed in low position              Medication Interventions: Teach patient to arise slowly

## 2017-10-14 NOTE — PROGRESS NOTES
Problem: Psychosis  Goal: *STG: Remains safe in hospital  Outcome: Progressing Towards Goal  Pt is asleep in bed. Continues on q15 min checks for safety. Will continue to monitor and assess pt.    0549  Pt refused HgAIC with EAG lab draw.

## 2017-10-15 PROCEDURE — 74011250637 HC RX REV CODE- 250/637: Performed by: PSYCHIATRY & NEUROLOGY

## 2017-10-15 PROCEDURE — 74011000250 HC RX REV CODE- 250: Performed by: PSYCHIATRY & NEUROLOGY

## 2017-10-15 PROCEDURE — 74011250636 HC RX REV CODE- 250/636: Performed by: PSYCHIATRY & NEUROLOGY

## 2017-10-15 PROCEDURE — 65220000003 HC RM SEMIPRIVATE PSYCH

## 2017-10-15 RX ORDER — FLUPHENAZINE HYDROCHLORIDE 5 MG/ML
10 SOLUTION, CONCENTRATE ORAL 2 TIMES DAILY
Status: DISCONTINUED | OUTPATIENT
Start: 2017-10-15 | End: 2017-10-16

## 2017-10-15 RX ORDER — FLUPHENAZINE HYDROCHLORIDE 2.5 MG/ML
5 INJECTION, SOLUTION INTRAMUSCULAR 2 TIMES DAILY
Status: DISCONTINUED | OUTPATIENT
Start: 2017-10-15 | End: 2017-10-16

## 2017-10-15 RX ADMIN — FLUPHENAZINE HYDROCHLORIDE 10 MG: 5 SOLUTION, CONCENTRATE ORAL at 18:13

## 2017-10-15 RX ADMIN — DIPHENHYDRAMINE HYDROCHLORIDE 50 MG: 25 CAPSULE ORAL at 21:48

## 2017-10-15 RX ADMIN — VALPROIC ACID 250 MG: 250 SOLUTION ORAL at 21:48

## 2017-10-15 RX ADMIN — DIPHENHYDRAMINE HYDROCHLORIDE 50 MG: 25 CAPSULE ORAL at 08:34

## 2017-10-15 RX ADMIN — LORAZEPAM 1 MG: 1 TABLET ORAL at 03:23

## 2017-10-15 RX ADMIN — ACETAMINOPHEN 650 MG: 325 TABLET, FILM COATED ORAL at 11:53

## 2017-10-15 RX ADMIN — LORAZEPAM 2 MG: 2 INJECTION INTRAMUSCULAR at 10:31

## 2017-10-15 RX ADMIN — IBUPROFEN 400 MG: 400 TABLET, FILM COATED ORAL at 08:35

## 2017-10-15 RX ADMIN — FLUPHENAZINE HYDROCHLORIDE 5 MG: 2.5 INJECTION, SOLUTION INTRAMUSCULAR at 10:30

## 2017-10-15 NOTE — BH NOTES
GROUP THERAPY PROGRESS NOTE    Larinda Lennox is participating in [place]. Group time: 10 minutes    Personal goal for participation: \"I ain't sure. It's more than I can verbalize. \"    Goal orientation: personal    Group therapy participation: active    Therapeutic interventions reviewed and discussed: Writer listened attentively and explained unit rules. Impression of participation: Patient participated actively.

## 2017-10-15 NOTE — BH NOTES
2335-patient up in hallway and by nurse station. Requested and given ice water. This writer offered patient sleeping medication and patient refused. Patient returned to his room. Continue to monitor q 15 minutes for safety. 0315-patient pacing in hallway. Walking back to dayroom where a female patient is asleep in Psych 2. This writer asked patient to not go into the dayroom back there and patient throws hands up in air and gestures angrily, stating \"there's people sleeping everywhere. \" Explained to patient no male patient's to go into dayroom with female sleeping there. Patient responding to internal stimuli and laughing frequently and mumbling incoherently at times. Patient heard to say \"slap\" and patient told this writer to \"sit down and chill out. \" Security called at this time. 0323-patient offered Ativan 2mg IM, but refused and patient voluntarily took Ativan 1mg PO. Patient instructed to return to his room and went to his room. 0335-patient out of room and instructed to return to his room. Patient went back to his room. Patient offered crayons and paper and refused. Continues to laugh and mumble.
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
## 2017-10-15 NOTE — PROGRESS NOTES
Problem: Psychosis  Goal: *STG: Remains safe in hospital  Outcome: Progressing Towards Goal  Pt out in milieu pacing and restless at times. Pt seen laughing and smiling inappropriately. Continues to be bizarre while frequently staring at staff. Continues to refuse to take any medications at this time. Staff will continue to monitor q 15 min checks.

## 2017-10-15 NOTE — PROGRESS NOTES
Problem: Psychosis  Goal: *STG: Decreased delusional thinking  Outcome: Progressing Towards Goal  Thoughts remain disorganized with delusional content. Goal: *STG: Remains safe in hospital  Outcome: Progressing Towards Goal  Pt denies any suicidal or homicidal thoughts. \" Contracts for safety. Stated when asked question  concerning safety \"I don`t kill go kill yourself. \" Remains on q 15 min safety checks.

## 2017-10-15 NOTE — BH NOTES
Pt refused hs medications. No linked order in chart for court order at this time. PRN Medication Documentation-[id number]    Specific patient behavior that led to need for PRN medication: c/o headache 8 of 10  Staff interventions attempted prior to PRN being given: encouraged med compliance and rest.  PRN medication given: motrin 400 mg po   Patient response/effectiveness of PRN medication: pt expressed relief of headache. Prn effective.

## 2017-10-15 NOTE — PROGRESS NOTES
Problem: Psychosis  Goal: *STG: Remains safe in hospital  Outcome: Progressing Towards Goal  Pt remains delusional with disorganized thoughts. Appetite is good. Needing lots of encouragement to take court ordered medications but pt eventually complies. Staff will continue to monitor q 15 min checks.

## 2017-10-15 NOTE — BH NOTES
Behavioral Health Interdisciplinary Rounds     Patient Name: Betty Ba  Age: 30 y.o. Room/Bed:  730/01  Primary Diagnosis: [facility]   Admission Status: Involuntary Commitment and Forced Medication Order     Readmission within 30 days: no  Power of  in place: no  Patient requires a blocked bed: yes          Reason for blocked bed: aggressive behavior    VTE Prophylaxis: Not indicated  Flu vaccine given : no-patient refused   Mobility needs/Fall risk: no    Nutritional Plan: no  Consults:          Labs/Testing due today?: no    Sleep hours:        Participation in Care/Groups:  Yes, some  Medication Compliant?: Refusing Psychiatric Medications  PRNS (last 24 hours):  Antianxiety    Restraints (last 24 hours):  no  Substance Abuse:  no  CIWA (range last 24 hours):  COWS (range last 24 hours):   Alcohol screening (AUDIT) completed -  AUDIT Score: 0  If applicable, date SBIRT discussed in treatment team AND documented:   Tobacco - patient is a smoker: yes   Date tobacco education completed by RN: 10-11-17  24 hour chart check complete: yes     Patient goal(s) for today:   Treatment team focus/goals:   Progress note     LOS:  4  Expected LOS:     Financial concerns/prescription coverage:    Date of last family contact:       Family requesting physician contact today:    Discharge plan:   Guns in the home:         Outpatient provider(s):     Participating treatment team members: Betty Ba, * (assigned SW),

## 2017-10-16 PROCEDURE — 74011250636 HC RX REV CODE- 250/636: Performed by: PSYCHIATRY & NEUROLOGY

## 2017-10-16 PROCEDURE — 65220000003 HC RM SEMIPRIVATE PSYCH

## 2017-10-16 PROCEDURE — 74011250637 HC RX REV CODE- 250/637: Performed by: PSYCHIATRY & NEUROLOGY

## 2017-10-16 RX ORDER — FLUPHENAZINE HYDROCHLORIDE 5 MG/1
10 TABLET ORAL 3 TIMES DAILY
Status: DISCONTINUED | OUTPATIENT
Start: 2017-10-16 | End: 2017-10-18

## 2017-10-16 RX ORDER — DIPHENHYDRAMINE HYDROCHLORIDE 50 MG/ML
25 INJECTION, SOLUTION INTRAMUSCULAR; INTRAVENOUS 3 TIMES DAILY
Status: DISCONTINUED | OUTPATIENT
Start: 2017-10-16 | End: 2017-10-18

## 2017-10-16 RX ORDER — DIVALPROEX SODIUM 500 MG/1
500 TABLET, EXTENDED RELEASE ORAL 2 TIMES DAILY
Status: DISCONTINUED | OUTPATIENT
Start: 2017-10-16 | End: 2017-10-17

## 2017-10-16 RX ORDER — LORAZEPAM 2 MG/ML
2 INJECTION INTRAMUSCULAR 2 TIMES DAILY
Status: DISCONTINUED | OUTPATIENT
Start: 2017-10-16 | End: 2017-10-17

## 2017-10-16 RX ORDER — FLUPHENAZINE HYDROCHLORIDE 2.5 MG/ML
5 INJECTION, SOLUTION INTRAMUSCULAR 3 TIMES DAILY
Status: DISCONTINUED | OUTPATIENT
Start: 2017-10-16 | End: 2017-10-18

## 2017-10-16 RX ORDER — DIPHENHYDRAMINE HCL 25 MG
25 CAPSULE ORAL 3 TIMES DAILY
Status: DISCONTINUED | OUTPATIENT
Start: 2017-10-16 | End: 2017-10-18

## 2017-10-16 RX ADMIN — DIPHENHYDRAMINE HYDROCHLORIDE 50 MG: 25 CAPSULE ORAL at 09:38

## 2017-10-16 RX ADMIN — DIPHENHYDRAMINE HYDROCHLORIDE 25 MG: 25 CAPSULE ORAL at 16:12

## 2017-10-16 RX ADMIN — DIVALPROEX SODIUM 500 MG: 500 TABLET, FILM COATED, EXTENDED RELEASE ORAL at 21:35

## 2017-10-16 RX ADMIN — DIPHENHYDRAMINE HYDROCHLORIDE 25 MG: 25 CAPSULE ORAL at 21:35

## 2017-10-16 RX ADMIN — FLUPHENAZINE HYDROCHLORIDE 10 MG: 5 TABLET, FILM COATED ORAL at 21:36

## 2017-10-16 RX ADMIN — LORAZEPAM 2 MG: 2 INJECTION INTRAMUSCULAR at 09:48

## 2017-10-16 RX ADMIN — FLUPHENAZINE HYDROCHLORIDE 10 MG: 5 TABLET, FILM COATED ORAL at 16:12

## 2017-10-16 NOTE — BH NOTES
GROUP THERAPY PROGRESS NOTE    The patient Betty Ba a 30 y.o. male is participating in Social Group.     Group time: 30 minutes    Personal goal for participation: Daily goal setting    Goal orientation: personal    Group therapy participation: active    Therapeutic interventions reviewed and discussed:  Yes    Impression of participation:     Nevin Beck  10/15/2017  8:58 PM

## 2017-10-16 NOTE — PROGRESS NOTES
Pt alert. Pacing unit. Laughing inappropriately. Compliant with scheduled po benadryl 50 mg. Refuses PO prolixin and po depakene. Refuse education. Scheduled IM 5 mg prolixin give. PRN Medication Documentation    Specific patient behavior that led to need for PRN medication: see above, pt becoming agitated difficult to redirect  Staff interventions attempted prior to PRN being given: education, therapeutic communication   PRN medication given: IM 2 mg ativan   Patient response/effectiveness of PRN medication: pt pacing unit    1152- pt ambulating frequently. Calm with soft speech. AH; responding verbally.

## 2017-10-16 NOTE — PROGRESS NOTES
Problem: Falls - Risk of  Goal: *Absence of Falls  Document Kedar Fall Risk and appropriate interventions in the flowsheet. Outcome: Progressing Towards Goal  Fall Risk Interventions:        Mentation Interventions: Adequate sleep, hydration, pain control     Medication Interventions: Assess postural VS orthostatic hypotension        Resting in bed with eyes closed, no complaints, no distress noted. Safety measures in place, 15 minute checks, will continue to monitor.

## 2017-10-16 NOTE — PROGRESS NOTES
Problem: Psychosis  Goal: *STG: Decreased hallucinations  Outcome: Not Progressing Towards Goal  Pt alert labile. Pacing unit. Poor insight. Poor safety awareness. Poor boundaries. AH. Pt responding to internal stimuli. Goal: *STG: Remains safe in hospital  Outcome: Progressing Towards Goal  Pt remains safe in hospital on q 15 min safety checks. Pt visible on unit. Goal: *STG: Patient will verbalize areas in need of boundary recognition and limit setting  Outcome: Not Progressing Towards Goal  Pt appears angry labile. Poor coping skills. Goal: *STG/LTG: Complies with medication therapy  Outcome: Not Progressing Towards Goal  Pt refuses medications. Tolerating IM medications fair with 3 security members present.

## 2017-10-16 NOTE — PROGRESS NOTES
Pt alert. Pacing unit. Laughing inappropriately. Compliant with scheduled po benadryl 50 mg. Refuses PO prolixin and po depakene. Refuse education. Scheduled IM 5 mg prolixin give.      PRN Medication Documentation    Specific patient behavior that led to need for PRN medication: see above, pt becoming agitated difficult to redirect  Staff interventions attempted prior to PRN being given: education, therapeutic communication   PRN medication given: IM 2 mg ativan   Patient response/effectiveness of PRN medication: pt pacing unit

## 2017-10-16 NOTE — BH NOTES
Behavioral Health Interdisciplinary Rounds     Patient Name: Rehana Ramirez  Age: 30 y.o. Room/Bed:  730/01  Primary Diagnosis: Schizoaffective disorder (HCC)   Admission Status: Involuntary Commitment And forced medication order    Readmission within 30 days: no  Power of  in place: no  Patient requires a blocked bed: yes          Reason for blocked bed: aggressive behavior    VTE Prophylaxis: Not indicated  Flu vaccine given : no refused  Mobility needs/Fall risk: no    Nutritional Plan: no  Consults:          Labs/Testing due today?: no    Sleep hours: 12       Participation in Care/Groups:  Yes, some  Medication Compliant?: Yes  PRNS (last 24 hours): None    Restraints (last 24 hours):  no  Substance Abuse:  no  CIWA (range last 24 hours):  COWS (range last 24 hours):   Alcohol screening (AUDIT) completed -  AUDIT Score: 0  If applicable, date SBIRT discussed in treatment team AND documented:   Tobacco - patient is a smoker: yes   Date tobacco education completed by RN: 10-11-17  24 hour chart check complete: yes     Patient goal(s) for today:   Treatment team focus/goals: Plant o titrate his medications, he is on forced medications. Progress note H e continue to talk t self and has been somewhat agitated at times. He continues to refuse some medications.       LOS:  5  Expected LOS: TBD    Financial concerns/prescription coverage:   Date of last family contact:      Family requesting physician contact today:    Discharge plan:he will return home with his parents when ready for discharge   Guns in the home: np       Outpatient provider(s): [address]   Participating treatment team members: Ike Elizondo Dr., RN

## 2017-10-16 NOTE — PROGRESS NOTES
Problem: Psychosis  Goal: *STG: Decreased hallucinations  Outcome: Progressing Towards Goal  Denies AH but appears to be responding to internal stimuli. Thoughts are disorganized. Goal: *STG/LTG: Complies with medication therapy  Outcome: Not Progressing Towards Goal  Refused po medication. Court ordered medication alternative enacted. Goal: *STG/LTG: Demonstrates improved thought patterns as evidenced by logical and coherent speech  Outcome: Not Progressing Towards Goal  Speech is frequently \"mumbling\"and difficult to understand. Goal: *STG/LTG: Demonstrates improved social functioning by responding appropriately to staff  Outcome: Not Progressing Towards Goal  Frequently when asked question answers don't all relate to topic.

## 2017-10-16 NOTE — BH NOTES
Patient attended group but did not verbally participate in group discussion. Group discussion was on unit expectation and setting personal goals. Patient appears paranoid and be responding to internal stimuli.
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
## 2017-10-17 PROCEDURE — 65220000003 HC RM SEMIPRIVATE PSYCH

## 2017-10-17 PROCEDURE — 74011250637 HC RX REV CODE- 250/637: Performed by: PSYCHIATRY & NEUROLOGY

## 2017-10-17 RX ORDER — LORAZEPAM 2 MG/ML
2 INJECTION INTRAMUSCULAR 2 TIMES DAILY
Status: DISCONTINUED | OUTPATIENT
Start: 2017-10-17 | End: 2017-10-24

## 2017-10-17 RX ADMIN — FLUPHENAZINE HYDROCHLORIDE 10 MG: 5 TABLET, FILM COATED ORAL at 17:14

## 2017-10-17 RX ADMIN — DIPHENHYDRAMINE HYDROCHLORIDE 25 MG: 25 CAPSULE ORAL at 21:22

## 2017-10-17 RX ADMIN — DIPHENHYDRAMINE HYDROCHLORIDE 25 MG: 25 CAPSULE ORAL at 17:14

## 2017-10-17 RX ADMIN — FLUPHENAZINE HYDROCHLORIDE 10 MG: 5 TABLET, FILM COATED ORAL at 09:00

## 2017-10-17 RX ADMIN — DIPHENHYDRAMINE HYDROCHLORIDE 25 MG: 25 CAPSULE ORAL at 09:46

## 2017-10-17 RX ADMIN — DIVALPROEX SODIUM 750 MG: 500 TABLET, FILM COATED, EXTENDED RELEASE ORAL at 21:23

## 2017-10-17 RX ADMIN — FLUPHENAZINE HYDROCHLORIDE 10 MG: 5 TABLET, FILM COATED ORAL at 21:23

## 2017-10-17 RX ADMIN — DIVALPROEX SODIUM 500 MG: 500 TABLET, FILM COATED, EXTENDED RELEASE ORAL at 09:00

## 2017-10-17 NOTE — BH NOTES
Patient in bed asleep during initial rounds as respirations were even and unlabored with chest rising and falling. Will continue to monitor throughout shift.
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
## 2017-10-17 NOTE — INTERDISCIPLINARY ROUNDS
Behavioral Health Interdisciplinary Rounds     Patient Name: Domingo Gao  Age: 30 y.o. Room/Bed:  730/01  Primary Diagnosis: Schizoaffective disorder (HCC)   Admission Status: Involuntary Commitment And force medication order    Readmission within 30 days: no  Power of  in place: no  Patient requires a blocked bed: yes         Reason for blocked bed: aggressive    VTE Prophylaxis: Not indicated  Flu vaccine given : no refused  Mobility needs/Fall risk: no    Nutritional Plan: no  Consults:          Labs/Testing due today?: no    Sleep hours:  6.25      Participation in Care/Groups:  no  Medication Compliant?: Selective  PRNS (last 24 hours): None    Restraints (last 24 hours):  no  Substance Abuse:  no  CIWA (range last 24 hours): COWS (range last 24 hours):   Alcohol screening (AUDIT) completed -  AUDIT Score: 0  If applicable, date SBIRT discussed in treatment team AND documented:   Tobacco - patient is a smoker: yes   Date tobacco education completed by RN: 10/11/17  24 hour chart check complete: yes     Patient goal(s) for today:   Treatment team focus/goals: Plan to continue to titrate his medications. Progress note -: He remains paranoid guarded and has an angry edge. He has been complaint with po medications. LOS:  6  Expected LOS: TBD     Financial concerns/prescription coverage:    Date of last family contact:      Family requesting physician contact today:    Discharge plan: he will return home with his family   Guns in the home:  No        Outpatient provider(s): [id number]     Participating treatment team members: Leslie Caldwell, RN - Kodi Moreland, PharmD.
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
## 2017-10-17 NOTE — PROGRESS NOTES
Problem: Psychosis  Goal: *STG: Remains safe in hospital  Outcome: Progressing Towards Goal  Pt out in milieu restless and pacing at times. Seen mumbling and talking to self at times. Needing some encouragement during medication administration. Staff will continue to monitor q 15 min checks.
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
## 2017-10-17 NOTE — PROGRESS NOTES
Problem: Psychosis  Goal: *STG: Decreased hallucinations  Pt. Continues to respond to internal stimuli  Laughing inappropriately     Distracted  Talking to self   Goal: *STG/LTG: Complies with medication therapy  Variance: Patient slowly responding  Medication  Compliant   With court ordered medications      Goal: *STG/LTG: Demonstrates improved social functioning by responding appropriately to staff  Variance: Patient Condition  Pt. Minimally socializing with peers  Not attending groups on unit      Goal: Interventions  Will continue to monitor  On 15 min. Checks for safety   Assess thought process   Voices  Lability  Medication compliance  Effectiveness  Encourage group participation    Problem: Falls - Risk of  Goal: *Absence of Falls  Document Kedar Fall Risk and appropriate interventions in the flowsheet.    Fall Risk Interventions: non slip socks  Bed in low position  Mobility Interventions: Communicate number of staff needed for ambulation/transfer     Mentation Interventions: Room close to nurse's station     Medication Interventions: Assess postural VS orthostatic hypotension

## 2017-10-18 PROCEDURE — 74011250637 HC RX REV CODE- 250/637: Performed by: PSYCHIATRY & NEUROLOGY

## 2017-10-18 PROCEDURE — 65220000003 HC RM SEMIPRIVATE PSYCH

## 2017-10-18 RX ORDER — DIPHENHYDRAMINE HYDROCHLORIDE 50 MG/ML
25 INJECTION, SOLUTION INTRAMUSCULAR; INTRAVENOUS 2 TIMES DAILY
Status: DISCONTINUED | OUTPATIENT
Start: 2017-10-18 | End: 2017-11-03 | Stop reason: HOSPADM

## 2017-10-18 RX ORDER — DIPHENHYDRAMINE HCL 25 MG
25 CAPSULE ORAL 2 TIMES DAILY
Status: DISCONTINUED | OUTPATIENT
Start: 2017-10-18 | End: 2017-11-03 | Stop reason: HOSPADM

## 2017-10-18 RX ORDER — FLUPHENAZINE HYDROCHLORIDE 5 MG/1
10 TABLET ORAL 3 TIMES DAILY
Status: DISCONTINUED | OUTPATIENT
Start: 2017-10-18 | End: 2017-10-27

## 2017-10-18 RX ORDER — CHLORPROMAZINE HYDROCHLORIDE 50 MG/1
50 TABLET, FILM COATED ORAL 2 TIMES DAILY
Status: DISCONTINUED | OUTPATIENT
Start: 2017-10-18 | End: 2017-10-19

## 2017-10-18 RX ORDER — FLUPHENAZINE HYDROCHLORIDE 2.5 MG/ML
7.5 INJECTION, SOLUTION INTRAMUSCULAR 3 TIMES DAILY
Status: DISCONTINUED | OUTPATIENT
Start: 2017-10-18 | End: 2017-10-27

## 2017-10-18 RX ORDER — CHLORPROMAZINE HYDROCHLORIDE 25 MG/ML
50 INJECTION INTRAMUSCULAR 2 TIMES DAILY
Status: DISCONTINUED | OUTPATIENT
Start: 2017-10-18 | End: 2017-10-19

## 2017-10-18 RX ADMIN — FLUPHENAZINE HYDROCHLORIDE 10 MG: 5 TABLET, FILM COATED ORAL at 15:45

## 2017-10-18 RX ADMIN — CHLORPROMAZINE HYDROCHLORIDE 50 MG: 50 TABLET, SUGAR COATED ORAL at 21:01

## 2017-10-18 RX ADMIN — FLUPHENAZINE HYDROCHLORIDE 10 MG: 5 TABLET, FILM COATED ORAL at 21:01

## 2017-10-18 RX ADMIN — DIPHENHYDRAMINE HYDROCHLORIDE 25 MG: 25 CAPSULE ORAL at 08:13

## 2017-10-18 RX ADMIN — DIVALPROEX SODIUM 750 MG: 500 TABLET, FILM COATED, EXTENDED RELEASE ORAL at 08:13

## 2017-10-18 RX ADMIN — DIVALPROEX SODIUM 750 MG: 500 TABLET, FILM COATED, EXTENDED RELEASE ORAL at 21:01

## 2017-10-18 RX ADMIN — ACETAMINOPHEN 650 MG: 325 TABLET, FILM COATED ORAL at 08:14

## 2017-10-18 RX ADMIN — DIPHENHYDRAMINE HYDROCHLORIDE 25 MG: 25 CAPSULE ORAL at 21:01

## 2017-10-18 RX ADMIN — FLUPHENAZINE HYDROCHLORIDE 10 MG: 5 TABLET, FILM COATED ORAL at 08:13

## 2017-10-18 NOTE — BH NOTES
Patient is on two antipsychotics now due to the relative refractoriness to treatment    thus far. Prior to admission patient had THREE or more failed trails of monotherapy, prolixin. The patient is a very slow responder to medications. Risks and benefits in the use of two antipsychotics have been weighed in full, including the risk of metabolic syndrome and the potential increased risk of QTc  Based on this analysis, it is considered favorable for the utilization of two antipsychotics. In the future, once stable on the two antipsychotics, patient can possibly be tapered to one of the chosen antipsychotics. Will check on EKG results today to monitor QTc.

## 2017-10-18 NOTE — PROGRESS NOTES
Problem: Psychosis  Goal: *STG: Decreased hallucinations  Outcome: Progressing Towards Goal  Appears to be responding to internal stimuli. Observed during conversation where he would stop in mid sentence and speak to unseen person for answer to questions by Alanis Burroughs. Goal: *STG: Remains safe in hospital  Outcome: Progressing Towards Goal  Pt denies any suicidal or homicidal thoughts. Contracts for safety. Remains on q 15 min safety checks. Goal: *STG: Accept constructive criticism without injury or isolation  Outcome: Not Progressing Towards Goal  Appears oblivious when given redirection. Mumbles to self which is inaudible. Goal: *STG: Participates in individual and group therapy  Outcome: Not Progressing Towards Goal  Tends to isolate and declines attendance frequently but on positive note attended community meeting today.

## 2017-10-18 NOTE — BH NOTES
GROUP THERAPY PROGRESS NOTE    Jonnie Jacobson is participating in Process Group.      Group time: 45 minutes    Personal goal for participation: N/A    Goal orientation:     Group therapy participation: Patient did not participate even though encouraged by staff to do so    Therapeutic interventions reviewed and discussed: N/A    Impression of participation: N/A

## 2017-10-18 NOTE — INTERDISCIPLINARY ROUNDS
Behavioral Health Interdisciplinary Rounds     Patient Name: Kit Buckley  Age: 30 y.o. Room/Bed:  730/01  Primary Diagnosis: [facility]ffect[facility] disorder ([facility]   Admission Status: Involuntary Commitment and Forced Medication Order     Readmission within 30 days: no  Power of  in place: no  Patient requires a blocked bed: yes          Reason for blocked bed: Disruptive behavioral    VTE Prophylaxis: Not indicated  Flu vaccine given : no   Mobility needs/Fall risk: no    Nutritional Plan: no  Consults:          Labs/Testing due today?: no    Sleep hours:  7.50      Participation in Care/Groups:  no  Medication Compliant?: Selective  PRNS (last 24 hours): None    Restraints (last 24 hours):  no  Substance Abuse:  no  CIWA (range last 24 hours):  COWS (range last 24 hours):   Alcohol screening (AUDIT) completed -  AUDIT Score: 0  If applicable, date SBIRT discussed in treatment team AND documented:   Tobacco - patient is a smoker: yes   Date tobacco education completed by RN: 10/11/17  24 hour chart check complete: yes     Patient goal(s) for today:   Treatment team focus/goals: Plan to titrate his medications. Progress note He remains psychotic and delusional.  Angry edge and needs direction from nursing staff. LOS:  7  Expected LOS: - TBD   Financial concerns/prescription coverage:    Date of last family contact:       Family requesting physician contact today:    Discharge plan: He will return home when ready for discharge.     Guns in the home:  no       Outpatient provider(s): [id number]     Participating treatment team members: Elvina Rubinstein Dr. Danney Martins, RN
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
## 2017-10-18 NOTE — BH NOTES
The documentation for this period is being entered following the guidelines as defined in the [facility] by Delmi Menendez RN.

## 2017-10-18 NOTE — PROGRESS NOTES
Problem: Psychosis  Goal: *STG: Remains safe in hospital  Outcome: Progressing Towards Goal  Visible on the unit. Mood is anxious. Pt denies SI. Compliant with meals and medications. Continue to encourage and educate.

## 2017-10-18 NOTE — BH NOTES
In bed asleep at start of shift and respirations noted as even and unlabored as chest was rising and falling. Will continue to monitor throughout shift.

## 2017-10-18 NOTE — BH NOTES
GROUP THERAPY PROGRESS NOTE    The patient Domingo Gao is participating in [employer]. Group time: 30 minutes    Personal goal for participation: to orient the patient to the unit.     Goal orientation: successful adoption of unit rules    Group therapy participation: minimal    Therapeutic interventions reviewed and discussed: Yes    Impression of participation:     Dee Arita  10/18/2017 9:59 AM

## 2017-10-19 PROCEDURE — 74011250637 HC RX REV CODE- 250/637: Performed by: PSYCHIATRY & NEUROLOGY

## 2017-10-19 PROCEDURE — 36415 COLL VENOUS BLD VENIPUNCTURE: CPT | Performed by: PSYCHIATRY & NEUROLOGY

## 2017-10-19 PROCEDURE — 65220000003 HC RM SEMIPRIVATE PSYCH

## 2017-10-19 PROCEDURE — 80165 DIPROPYLACETIC ACID FREE: CPT | Performed by: PSYCHIATRY & NEUROLOGY

## 2017-10-19 RX ORDER — CHLORPROMAZINE HYDROCHLORIDE 25 MG/ML
50 INJECTION INTRAMUSCULAR 3 TIMES DAILY
Status: DISCONTINUED | OUTPATIENT
Start: 2017-10-19 | End: 2017-10-20

## 2017-10-19 RX ORDER — CHLORPROMAZINE HYDROCHLORIDE 50 MG/1
50 TABLET, FILM COATED ORAL 3 TIMES DAILY
Status: DISCONTINUED | OUTPATIENT
Start: 2017-10-19 | End: 2017-10-20

## 2017-10-19 RX ADMIN — DIPHENHYDRAMINE HYDROCHLORIDE 25 MG: 25 CAPSULE ORAL at 08:08

## 2017-10-19 RX ADMIN — DIVALPROEX SODIUM 750 MG: 500 TABLET, FILM COATED, EXTENDED RELEASE ORAL at 21:54

## 2017-10-19 RX ADMIN — FLUPHENAZINE HYDROCHLORIDE 10 MG: 5 TABLET, FILM COATED ORAL at 08:08

## 2017-10-19 RX ADMIN — CHLORPROMAZINE HYDROCHLORIDE 50 MG: 50 TABLET, SUGAR COATED ORAL at 16:20

## 2017-10-19 RX ADMIN — CHLORPROMAZINE HYDROCHLORIDE 50 MG: 50 TABLET, SUGAR COATED ORAL at 21:54

## 2017-10-19 RX ADMIN — FLUPHENAZINE HYDROCHLORIDE 10 MG: 5 TABLET, FILM COATED ORAL at 21:54

## 2017-10-19 RX ADMIN — DIPHENHYDRAMINE HYDROCHLORIDE 25 MG: 25 CAPSULE ORAL at 21:54

## 2017-10-19 RX ADMIN — DIVALPROEX SODIUM 750 MG: 500 TABLET, FILM COATED, EXTENDED RELEASE ORAL at 08:08

## 2017-10-19 RX ADMIN — FLUPHENAZINE HYDROCHLORIDE 10 MG: 5 TABLET, FILM COATED ORAL at 16:20

## 2017-10-19 RX ADMIN — CHLORPROMAZINE HYDROCHLORIDE 50 MG: 50 TABLET, SUGAR COATED ORAL at 08:08

## 2017-10-19 NOTE — PROGRESS NOTES
Problem: Psychosis  Goal: *STG: Remains safe in hospital  Outcome: Progressing Towards Goal  Visible on the unit. Mood is anxious. Pacing noted. Continue to encourage and educate.

## 2017-10-19 NOTE — PROGRESS NOTES
Problem: Psychosis  Goal met by 10/27/2017  Goal: *STG: Decreased hallucinations  Pt. Continues to respond to internal stimuli    Pacing  Laughing inappropriately  Goal: *STG/LTG: Complies with medication therapy  Pt. Court ordered medications    Compliant PO with much encouragement from nurse  Goal: *STG/LTG: Demonstrates improved social functioning by responding appropriately to staff  Pt. Is minimally socializing  Not attending group  Goal: Interventions  Will continue to monitor  On 15 min. Checks for safety  Assess thought process     Hallucinations  Medication compliance   Effectiveness   Encourage groups     Problem: Falls - Risk of  Goal: *Absence of Falls  Document Kedar Fall Risk and appropriate interventions in the flowsheet. Fall Risk Interventions: non slip socks  Bed in low position  Mobility Interventions: Assess mobility with egress test    Mentation Interventions: Reorient patient    Medication Interventions: Teach patient to arise slowly    Elimination Interventions:  Toilet paper/wipes in reach    History of Falls Interventions: Room close to nurse's station        [address]        Date Treatment Plan Initiated:  10/19/2017      Treatment Plan Modalities:    Type of Modality Amount  (x minutes) Frequency (x/week) Duration (x days) Name of Responsible Staff   Community & wrap-up meetings to encourage peer interactions    15    7    1      SAWYER Sen   Group psychotherapy to assist in building coping skills and internal controls    60    7    1    Karson Lux LCSW   Therapeutic activity groups to build coping skills    60    7    1    Karson Lux LCSW   Psychoeducation in group setting to address:   Medication education    15    7    1     nikolay Vidales RN   Coping skills    20    7    1     Zena Fuentes RN   Relaxation techniques           Symptom management           Discharge planning    15    7    1   Juan Pablo Benjamin,    Spirituality     60    7    1     Hue RODRIGUEZ    60    7    1    Volunteer from [facility]/AA/NA    60    7    1    Volunteer from [address] [place] [address] medication management    15    7    1    Dr. Massiel Miramontes meeting/discharge planning

## 2017-10-19 NOTE — BH NOTES
LEISURE GROUP THERAPY PROGRESS NOTE    The patient Augustin alfredo 30 y.o. male is participating in Leisure Group. Group time: 45 minutes    Personal goal for participation: Daily social interactions with other peers & staff.     Goal orientation: Relaxation activities    Group therapy participation: active    Therapeutic interventions reviewed and discussed:  Yes    Impression of participation: Active with question    Major Person  10/18/2017  9:53 PM
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
## 2017-10-19 NOTE — INTERDISCIPLINARY ROUNDS
Behavioral Health Interdisciplinary Rounds     Patient Name: Cristian Gregorio  Age: 30 y.o. Room/Bed:  730/01  Primary Diagnosis: Schizoaffective disorder ([facility] [address]   Admission Status: Involuntary Commitment     Readmission within 30 days: no  Power of  in place: no  Patient requires a blocked bed: yes          Reason for blocked bed:  Aggressive; disruptive behavior    VTE Prophylaxis: Not indicated  Flu vaccine given : no   Mobility needs/Fall risk: no    Nutritional Plan: no  Consults:          Labs/Testing due today?: yes  -  Lab drawn    Sleep hours:  6 3/4 hrs. Participation in Care/Groups:  yes  Medication Compliant?: Yes  PRNS (last 24 hours): Pain    Restraints (last 24 hours):  no  Substance Abuse:  no  CIWA (range last 24 hours):  COWS (range last 24 hours):   Alcohol screening (AUDIT) completed -  AUDIT Score: 0  If applicable, date SBIRT discussed in treatment team AND documented:   Tobacco - patient is a smoker: yes   Date tobacco education completed by RN: 10/11/2017  24 hour chart check complete: yes     Patient goal(s) for today:   Treatment team focus/goals: Plan to titrate his medications. Progress note he remains psychotic and paranoid. He is activily talking to self.  from [address] [place] [address] was here to see today.       LOS:  8  Expected LOS: TBD     Financial concerns/prescription coverage:    Date of last family contact:       Family requesting physician contact today:    Discharge plan: he will return home with family   Guns in the home: no        Outpatient provider(s): [address] [place] [address] ICT team     Participating treatment team members: Theresa Levine, RN

## 2017-10-19 NOTE — PROGRESS NOTES
Problem: Psychosis  Goal: *STG: Remains safe in hospital  Outcome: Progressing Towards Goal  Asleep in bed. Continues on q15 min checks for safety. Will continue to monitor and assess pt.    0640  Lab drawn. Initially refused but with much encouragement by staff pt was cooperative. Pt verbalized no complaints and was provided with positive feedback by staff for his compliance.

## 2017-10-20 PROCEDURE — 65220000003 HC RM SEMIPRIVATE PSYCH

## 2017-10-20 PROCEDURE — 74011250637 HC RX REV CODE- 250/637: Performed by: PSYCHIATRY & NEUROLOGY

## 2017-10-20 RX ORDER — CHLORPROMAZINE HYDROCHLORIDE 50 MG/1
75 TABLET, FILM COATED ORAL 3 TIMES DAILY
Status: DISCONTINUED | OUTPATIENT
Start: 2017-10-20 | End: 2017-10-23

## 2017-10-20 RX ORDER — CHLORPROMAZINE HYDROCHLORIDE 25 MG/ML
50 INJECTION INTRAMUSCULAR 3 TIMES DAILY
Status: DISCONTINUED | OUTPATIENT
Start: 2017-10-20 | End: 2017-10-23

## 2017-10-20 RX ADMIN — FLUPHENAZINE HYDROCHLORIDE 10 MG: 5 TABLET, FILM COATED ORAL at 16:40

## 2017-10-20 RX ADMIN — CHLORPROMAZINE HYDROCHLORIDE 50 MG: 50 TABLET, SUGAR COATED ORAL at 09:22

## 2017-10-20 RX ADMIN — DIPHENHYDRAMINE HYDROCHLORIDE 25 MG: 25 CAPSULE ORAL at 09:22

## 2017-10-20 RX ADMIN — CHLORPROMAZINE HYDROCHLORIDE 75 MG: 50 TABLET, SUGAR COATED ORAL at 21:30

## 2017-10-20 RX ADMIN — DIVALPROEX SODIUM 750 MG: 500 TABLET, FILM COATED, EXTENDED RELEASE ORAL at 21:31

## 2017-10-20 RX ADMIN — DIVALPROEX SODIUM 750 MG: 500 TABLET, FILM COATED, EXTENDED RELEASE ORAL at 09:22

## 2017-10-20 RX ADMIN — FLUPHENAZINE HYDROCHLORIDE 10 MG: 5 TABLET, FILM COATED ORAL at 09:22

## 2017-10-20 RX ADMIN — DIPHENHYDRAMINE HYDROCHLORIDE 25 MG: 25 CAPSULE ORAL at 21:30

## 2017-10-20 RX ADMIN — CHLORPROMAZINE HYDROCHLORIDE 75 MG: 50 TABLET, SUGAR COATED ORAL at 16:39

## 2017-10-20 RX ADMIN — FLUPHENAZINE HYDROCHLORIDE 10 MG: 5 TABLET, FILM COATED ORAL at 21:30

## 2017-10-20 NOTE — BH NOTES
Patient in bed asleep at start of shift and respirations noted to be even and labored as chest was rising and falling. Will continue to monitor throughout shift.

## 2017-10-20 NOTE — BH NOTES
GROUP THERAPY PROGRESS NOTE    Deb Michele is participating in [place]. Group time: 1 hour    Personal goal for participation: Gave no goal    Goal orientation: personal    Group therapy participation: left early    Therapeutic interventions reviewed and discussed: Writer listened attentively and explained unit rules. Impression of participation: left early.

## 2017-10-20 NOTE — INTERDISCIPLINARY ROUNDS
Behavioral Health Interdisciplinary Rounds     Patient Name: Aissatou Levine  Age: 30 y.o. Room/Bed:  730/01  Primary Diagnosis: Schizoaffective disorder ([facility] [address]   Admission Status: Involuntary Commitment and Forced Medication Order     Readmission within 30 days: no  Power of  in place: no  Patient requires a blocked bed: yes          Reason for blocked bed: disruptive    VTE Prophylaxis: Not indicated  Flu vaccine given : no   Mobility needs/Fall risk: no    Nutritional Plan: no  Consults:          Labs/Testing due today?: no    Sleep hours:  8++      Participation in Care/Groups:  no  Medication Compliant?: Selective  PRNS (last 24 hours): None    Restraints (last 24 hours):  no  Substance Abuse:  no  CIWA (range last 24 hours):  COWS (range last 24 hours):   Alcohol screening (AUDIT) completed -  AUDIT Score: 0  If applicable, date SBIRT discussed in treatment team AND documented:   Tobacco - patient is a smoker: yes   Date tobacco education completed by RN: 10/11/17  24 hour chart check complete: yes     Patient goal(s) for today:   Treatment team focus/goals: Plan to continue to titrate his medications. Progress note :  He has been compliant with his medications. He maybe a little better. LOS:  9  Expected LOS: TBD     Financial concerns/prescription coverage:    Date of last family contact: His [place]  visited yesterday.         Family requesting physician contact today:    Discharge plan: He will return home with his parents   Guns in the home:   no      Outpatient provider(s): [address]     Participating treatment team members: Evelin Marie RN - Sheldon Gonsalez RN
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
## 2017-10-20 NOTE — BH NOTES
GROUP THERAPY PROGRESS NOTE    Kendra Garcia is participating in [employer].      Group time: 45 minutes    Personal goal for participation: To gain awareness    Goal orientation: personal    Group therapy participation: Did  Not attend    Therapeutic interventions reviewed and discussed: Discharge and recovery handout    Impression of participation: N/A

## 2017-10-20 NOTE — PROGRESS NOTES
Problem: Psychosis  Goal: *STG: Remains safe in hospital  Patient is resting quietly in bed. Appears to be asleep. No respiratory distress noted.   Patient remains safe in hospital.

## 2017-10-20 NOTE — PROGRESS NOTES
Problem: Psychosis  Goal: *STG: Decreased delusional thinking  Pt. Met with Dr. Kiara Pugh loud to himself   mumbling  Goal: *STG/LTG: Complies with medication therapy  Outcome: Progressing Towards Goal   Court ordered medications    Compliant  With encouragement from nurse   Goal: *STG/LTG: Demonstrates improved social functioning by responding appropriately to staff  Pt. Not socializing with peers    Or attending groups  preoccupied  Goal: Interventions  Will continue to monitor  On 15 min. Checks for safety   Assess thought process   \"voices\"   Medication compliance  Effectiveness   Encourage  Unit participation     Problem: Falls - Risk of  Goal: *Absence of Falls  Document Kedar Fall Risk and appropriate interventions in the flowsheet. Fall Risk Interventions:  Non  Slip socks  Bed in low position  Mobility Interventions: Assess mobility with egress test    Mentation Interventions: Reorient patient    Medication Interventions: Teach patient to arise slowly    Elimination Interventions:  Toilet paper/wipes in reach    History of Falls Interventions: Room close to nurse's station

## 2017-10-21 LAB — VALPROATE FREE SERPL-MCNC: 6.8 UG/ML (ref 6–22)

## 2017-10-21 PROCEDURE — 74011250637 HC RX REV CODE- 250/637: Performed by: PSYCHIATRY & NEUROLOGY

## 2017-10-21 PROCEDURE — 65220000003 HC RM SEMIPRIVATE PSYCH

## 2017-10-21 RX ADMIN — FLUPHENAZINE HYDROCHLORIDE 10 MG: 5 TABLET, FILM COATED ORAL at 09:57

## 2017-10-21 RX ADMIN — FLUPHENAZINE HYDROCHLORIDE 10 MG: 5 TABLET, FILM COATED ORAL at 16:53

## 2017-10-21 RX ADMIN — FLUPHENAZINE HYDROCHLORIDE 10 MG: 5 TABLET, FILM COATED ORAL at 21:22

## 2017-10-21 RX ADMIN — DIVALPROEX SODIUM 750 MG: 500 TABLET, FILM COATED, EXTENDED RELEASE ORAL at 21:23

## 2017-10-21 RX ADMIN — DIVALPROEX SODIUM 750 MG: 500 TABLET, FILM COATED, EXTENDED RELEASE ORAL at 09:57

## 2017-10-21 RX ADMIN — CHLORPROMAZINE HYDROCHLORIDE 75 MG: 50 TABLET, SUGAR COATED ORAL at 09:58

## 2017-10-21 RX ADMIN — CHLORPROMAZINE HYDROCHLORIDE 75 MG: 50 TABLET, SUGAR COATED ORAL at 21:22

## 2017-10-21 RX ADMIN — DIPHENHYDRAMINE HYDROCHLORIDE 25 MG: 25 CAPSULE ORAL at 09:57

## 2017-10-21 RX ADMIN — CHLORPROMAZINE HYDROCHLORIDE 75 MG: 50 TABLET, SUGAR COATED ORAL at 16:53

## 2017-10-21 RX ADMIN — DIPHENHYDRAMINE HYDROCHLORIDE 25 MG: 25 CAPSULE ORAL at 21:23

## 2017-10-21 NOTE — PROGRESS NOTES
Problem: Psychosis  Goal: *STG: Decreased delusional thinking  Outcome: Progressing Towards Goal  Progressing slowly. Reduction in response  to internal stimuli. Periodic smiles. Reduced muscle tension. Appears slightly less angry. Labile. Poor boundaries. Goal: *STG: Remains safe in hospital  Outcome: Progressing Towards Goal  Pt remains safe in hospital on q 15 min safety checks. active on unit. Goal: attend groups. Pt refuses shower stating he took on yesterday. Pt tolerating meals and oral fluid well. Goal: *STG/LTG: Complies with medication therapy  Outcome: Progressing Towards Goal  Compliant with medications. Slightly reduced paranoid delusions observed by staff today.

## 2017-10-21 NOTE — BH NOTES
Sleeping during initial rounds as respirations were noted as even and unlabored as chest was rising and falling. Will continue to monitor throughout shoft

## 2017-10-21 NOTE — INTERDISCIPLINARY ROUNDS
Behavioral Health Interdisciplinary Rounds     Patient Name: Pat Tejeda  Age: 30 y.o.   Room/Bed:  730/  Primary Diagnosis: Schizoaffective disorder (HCC)   Admission Status: Involuntary Commitment Forced meds    Readmission within 30 days: no  Power of  in place: no  Patient requires a blocked bed: yes          Reason for blocked bed: Disruptive    VTE Prophylaxis: Not indicated  Flu vaccine given : no   Mobility needs/Fall risk: no    Nutritional Plan: no  Consults:          Labs/Testing due today?: no    Sleep hours:  6.75      Participation in Care/Groups:  no  Medication Compliant?: Yes and Selective  PRNS (last 24 hours): None    Restraints (last 24 hours):  no  Substance Abuse:  no  CIWA (range last 24 hours):  COWS (range last 24 hours):   Alcohol screening (AUDIT) completed -  AUDIT Score: 0  If applicable, date SBIRT discussed in treatment team AND documented:   Tobacco - patient is a smoker: yes   Date tobacco education completed by RN:   24 hour chart check complete: yes     Patient goal(s) for today:   Treatment team focus/goals:   Progress note     LOS:  10  Expected LOS:     Financial concerns/prescription coverage:    Date of last family contact:      Family requesting physician contact today:    Discharge plan:   Guns in the home:         Outpatient provider(s):     Participating treatment team members: Pat Tejeda, * (assigned SW),

## 2017-10-21 NOTE — PROGRESS NOTES
Problem: Psychosis  Goal: *STG: Remains safe in hospital  Patient is resting quietly in bed. Awake and alert. No respiratory distress noted.   Patient remains safe in hospital.

## 2017-10-21 NOTE — PROGRESS NOTES
GROUP THERAPY PROGRESS NOTE    Marcus Curry is participating in [employer]. Group time: 30 minutes    Personal goal for participation: daily progress    Goal orientation: personal    Group therapy participation: minimal    Therapeutic interventions reviewed and discussed:     Impression of participation: The patient appeared to be repsonding to internal stimuli.

## 2017-10-22 PROCEDURE — 65220000003 HC RM SEMIPRIVATE PSYCH

## 2017-10-22 PROCEDURE — 74011250637 HC RX REV CODE- 250/637: Performed by: PSYCHIATRY & NEUROLOGY

## 2017-10-22 PROCEDURE — 93005 ELECTROCARDIOGRAM TRACING: CPT

## 2017-10-22 RX ADMIN — ZOLPIDEM TARTRATE 10 MG: 10 TABLET ORAL at 21:14

## 2017-10-22 RX ADMIN — DIVALPROEX SODIUM 750 MG: 500 TABLET, FILM COATED, EXTENDED RELEASE ORAL at 10:12

## 2017-10-22 RX ADMIN — IBUPROFEN 400 MG: 400 TABLET, FILM COATED ORAL at 16:45

## 2017-10-22 RX ADMIN — FLUPHENAZINE HYDROCHLORIDE 10 MG: 5 TABLET, FILM COATED ORAL at 16:43

## 2017-10-22 RX ADMIN — CHLORPROMAZINE HYDROCHLORIDE 75 MG: 50 TABLET, SUGAR COATED ORAL at 16:42

## 2017-10-22 RX ADMIN — CHLORPROMAZINE HYDROCHLORIDE 75 MG: 50 TABLET, SUGAR COATED ORAL at 21:12

## 2017-10-22 RX ADMIN — DIPHENHYDRAMINE HYDROCHLORIDE 25 MG: 25 CAPSULE ORAL at 21:14

## 2017-10-22 RX ADMIN — FLUPHENAZINE HYDROCHLORIDE 10 MG: 5 TABLET, FILM COATED ORAL at 10:11

## 2017-10-22 RX ADMIN — CHLORPROMAZINE HYDROCHLORIDE 75 MG: 50 TABLET, SUGAR COATED ORAL at 10:12

## 2017-10-22 RX ADMIN — DIVALPROEX SODIUM 750 MG: 500 TABLET, FILM COATED, EXTENDED RELEASE ORAL at 21:13

## 2017-10-22 RX ADMIN — DIPHENHYDRAMINE HYDROCHLORIDE 25 MG: 25 CAPSULE ORAL at 10:12

## 2017-10-22 RX ADMIN — FLUPHENAZINE HYDROCHLORIDE 10 MG: 5 TABLET, FILM COATED ORAL at 21:14
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
## 2017-10-22 NOTE — BH NOTES
PRN Medication Documentation    Specific patient behavior that led to need for PRN medication: neck/back pain  Staff interventions attempted prior to PRN being given: relaxation, dimming of light  PRN medication given:  mg of motrin  Patient response/effectiveness of PRN medication: will reassess within 1 hour.

## 2017-10-22 NOTE — INTERDISCIPLINARY ROUNDS
Behavioral Health Interdisciplinary Rounds     Patient Name: Liset Metzger  Age: 30 y.o.   Room/Bed:  730/  Primary Diagnosis: Schizoaffective disorder (HCC)   Admission Status: Involuntary Commitment and Forced Medication Order     Readmission within 30 days: no  Power of  in place: no  Patient requires a blocked bed: yes          Reason for blocked bed: Disruptive     VTE Prophylaxis: Not indicated  Flu vaccine given : no   Mobility needs/Fall risk: no    Nutritional Plan: no  Consults:          Labs/Testing due today?: no    Sleep hours: 7.0       Participation in Care/Groups:  no  Medication Compliant?: Yes  PRNS (last 24 hours): None    Restraints (last 24 hours):  no  Substance Abuse:  no  CIWA (range last 24 hours):  COWS (range last 24 hours):   Alcohol screening (AUDIT) completed -  AUDIT Score: 0  If applicable, date SBIRT discussed in treatment team AND documented:   Tobacco - patient is a smoker: yes   Date tobacco education completed by RN:   24 hour chart check complete: yes     Patient goal(s) for today: Appropriate on unit  Treatment team focus/goals: Monitor behaviors  Progress note: Disorganized, displeased with staff    LOS:  11  Expected LOS: TBD    Financial concerns/prescription coverage:  [employer] Medicaid  Date of last family contact:      Family requesting physician contact today: No  Discharge plan: Return to parents' home when stable for discharge  Guns in the home: No        Outpatient provider(s): [address]    Participating treatment team members: Elayne Can MSW; Dr. Armida Inman MD; Lilly Mabry RN

## 2017-10-22 NOTE — PROGRESS NOTES
Problem: Psychosis  Goal: *STG: Decreased hallucinations  Outcome: Not Progressing Towards Goal  Mumbling during treatment team rounds and other times. Appears to be responding to internal stimuli.4  Goal: *STG: Remains safe in hospital  Outcome: Progressing Towards Goal  Pt denies any suicidal or homicidal thoughts. Contracts for safety. Remains on q 15 min safety checks. Goal: *STG: Patient will verbalize areas in need of boundary recognition and limit setting  Outcome: Not Progressing Towards Goal  Minimal interaction with staff or peers. Goal: *STG/LTG: Complies with medication therapy  Outcome: Progressing Towards Goal  Has been medication compliant but educational efforts related to medication appear to have no interest with him.
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
## 2017-10-22 NOTE — BH NOTES
Patient was in bed and sleeping at start of shift with respirations noted as even and unlabored as chest was rising and falling. Will continue to monitor for safety throughout shift.

## 2017-10-22 NOTE — BH NOTES
GROUP THERAPY PROGRESS NOTE    Aissatou Levine is participating in [place]. Group time: 20 minutes    Personal goal for participation: Personal goals and orientate to unit    Goal orientation: community    Group therapy participation: active    Therapeutic interventions reviewed and discussed: Reviewed unit expectations, identified personal goals and positive words    Impression of participation: Pt was cooperative throughout the discussion. Pt identified his personal goal for the day is to shower, change linens and was his clothes.

## 2017-10-23 LAB
ATRIAL RATE: 82 BPM
CALCULATED P AXIS, ECG09: 57 DEGREES
CALCULATED R AXIS, ECG10: -13 DEGREES
CALCULATED T AXIS, ECG11: 48 DEGREES
DIAGNOSIS, 93000: NORMAL
P-R INTERVAL, ECG05: 132 MS
Q-T INTERVAL, ECG07: 372 MS
QRS DURATION, ECG06: 92 MS
QTC CALCULATION (BEZET), ECG08: 434 MS
VENTRICULAR RATE, ECG03: 82 BPM

## 2017-10-23 PROCEDURE — 65220000003 HC RM SEMIPRIVATE PSYCH

## 2017-10-23 PROCEDURE — 74011250637 HC RX REV CODE- 250/637: Performed by: PSYCHIATRY & NEUROLOGY

## 2017-10-23 RX ORDER — CHLORPROMAZINE HYDROCHLORIDE 25 MG/ML
100 INJECTION INTRAMUSCULAR 3 TIMES DAILY
Status: DISCONTINUED | OUTPATIENT
Start: 2017-10-23 | End: 2017-10-25

## 2017-10-23 RX ORDER — CHLORPROMAZINE HYDROCHLORIDE 50 MG/1
100 TABLET, FILM COATED ORAL 3 TIMES DAILY
Status: DISCONTINUED | OUTPATIENT
Start: 2017-10-23 | End: 2017-10-25

## 2017-10-23 RX ADMIN — CHLORPROMAZINE HYDROCHLORIDE 100 MG: 50 TABLET, SUGAR COATED ORAL at 22:01

## 2017-10-23 RX ADMIN — DIVALPROEX SODIUM 750 MG: 500 TABLET, FILM COATED, EXTENDED RELEASE ORAL at 22:08

## 2017-10-23 RX ADMIN — DIPHENHYDRAMINE HYDROCHLORIDE 25 MG: 25 CAPSULE ORAL at 10:05

## 2017-10-23 RX ADMIN — CHLORPROMAZINE HYDROCHLORIDE 100 MG: 50 TABLET, SUGAR COATED ORAL at 17:21

## 2017-10-23 RX ADMIN — FLUPHENAZINE HYDROCHLORIDE 10 MG: 5 TABLET, FILM COATED ORAL at 10:05

## 2017-10-23 RX ADMIN — DIVALPROEX SODIUM 750 MG: 500 TABLET, FILM COATED, EXTENDED RELEASE ORAL at 10:05

## 2017-10-23 RX ADMIN — DIPHENHYDRAMINE HYDROCHLORIDE 25 MG: 25 CAPSULE ORAL at 22:01

## 2017-10-23 RX ADMIN — CHLORPROMAZINE HYDROCHLORIDE 75 MG: 50 TABLET, SUGAR COATED ORAL at 10:05

## 2017-10-23 RX ADMIN — FLUPHENAZINE HYDROCHLORIDE 10 MG: 5 TABLET, FILM COATED ORAL at 17:21

## 2017-10-23 RX ADMIN — FLUPHENAZINE HYDROCHLORIDE 10 MG: 5 TABLET, FILM COATED ORAL at 22:01

## 2017-10-23 NOTE — PROGRESS NOTES
Problem: Psychosis  Goal: *STG: Remains safe in hospital  Outcome: Progressing Towards Goal  Patient is lying in bed. Awake and alert. Greeted staff with a smile.   Patient remains safe in hospital.

## 2017-10-23 NOTE — BH NOTES
GROUP THERAPY PROGRESS NOTE    Liset Metzger did not participate in the Acute Unit's Process Group, with a focus identifying feelings, planning for the rest of the day, and discussing discharge.

## 2017-10-23 NOTE — BH NOTES
Patient was in bed asleep during initial rounds with respirations noted as even and unlabored as chest was rising and falling. Will continue to monitor for safety throughout shift.

## 2017-10-23 NOTE — BH NOTES
GROUP THERAPY PROGRESS NOTE    Horris Heimlich is participating in [employer].      Group time: 30 minutes    Personal goal for participation:     Goal orientation: social    Group therapy participation: active    Therapeutic interventions reviewed and discussed: Promoting appropriate social interaction    Impression of participation: Pt was engaged

## 2017-10-23 NOTE — BH NOTES
PRN Medication Documentation    Specific patient behavior that led to need for PRN medication: to promote sleep  Staff interventions attempted prior to PRN being given: dimming of lights, decrease in stimuli  PRN medication given: 10 mg ambien PO  Patient response/effectiveness of PRN medication: will continue to monitor
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
## 2017-10-23 NOTE — PROGRESS NOTES
Problem: Psychosis  Goal: *STG: Decreased hallucinations  Outcome: Not Progressing Towards Goal  Has been observed mumbling to self on frequent occasions but patient denies AH/VH. Goal: *STG: Remains safe in hospital  Outcome: Progressing Towards Goal  Pt denies any suicidal or homicidal thoughts. Contracts for safety. Remains on q 15 min safety checks.      Goal: *STG: Patient will develop strategies to regulate emotions and corresponding behaviors  Outcome: Not Progressing Towards Goal  Patient has been irritable and easily agitated

## 2017-10-23 NOTE — INTERDISCIPLINARY ROUNDS
Behavioral Health Interdisciplinary Rounds     Patient Name: Kenrick Way  Age: 30 y.o. Room/Bed:  730/01  Primary Diagnosis: Schizoaffective disorder ([facility]   Admission Status: Involuntary Commitment and Forced Medication Order     Readmission within 30 days: no  Power of  in place: no  Patient requires a blocked bed: yes          Reason for blocked bed:  disruptive    VTE Prophylaxis: Not indicated  Flu vaccine given : no   Mobility needs/Fall risk: no    Nutritional Plan: no  Consults:          Labs/Testing due today?: yes    Sleep hours:  8.0      Participation in Care/Groups:  no  Medication Compliant?: Yes  PRNS (last 24 hours): sleeper   Restraints (last 24 hours):  no  Substance Abuse:  no  CIWA (range last 24 hours):  COWS (range last 24 hours):   Alcohol screening (AUDIT) completed -  AUDIT Score: 0  If applicable, date SBIRT discussed in treatment team AND documented:   Tobacco - patient is a smoker: yes   Date tobacco education completed by RN: 10/11/17  24 hour chart check complete: yes     Patient goal(s) for today:   Treatment team focus/goals: Plan to continue to titrate his medications. Progress note - He continues to talk to self , but was able to answer questions in treatment team.  He has been compliant with his medications. LOS:  12  Expected LOS: TBD     Financial concerns/prescription coverage:    Date of last family contact:      Family requesting physician contact today:    Discharge plan: He will return home with his parents   Guns in the home:  no       Outpatient provider(s): [id number]     Participating treatment team members: Kasi Vargas, PharmD.

## 2017-10-24 LAB
ERYTHROCYTE [DISTWIDTH] IN BLOOD BY AUTOMATED COUNT: 12.7 % (ref 11.5–14.5)
HCT VFR BLD AUTO: 44.4 % (ref 36.6–50.3)
HGB BLD-MCNC: 15.2 G/DL (ref 12.1–17)
MCH RBC QN AUTO: 29.9 PG (ref 26–34)
MCHC RBC AUTO-ENTMCNC: 34.2 G/DL (ref 30–36.5)
MCV RBC AUTO: 87.2 FL (ref 80–99)
PLATELET # BLD AUTO: 190 K/UL (ref 150–400)
RBC # BLD AUTO: 5.09 M/UL (ref 4.1–5.7)
VALPROATE SERPL-MCNC: 81 UG/ML (ref 50–100)
WBC # BLD AUTO: 6.3 K/UL (ref 4.1–11.1)

## 2017-10-24 PROCEDURE — 80164 ASSAY DIPROPYLACETIC ACD TOT: CPT | Performed by: PSYCHIATRY & NEUROLOGY

## 2017-10-24 PROCEDURE — 65220000003 HC RM SEMIPRIVATE PSYCH

## 2017-10-24 PROCEDURE — 85027 COMPLETE CBC AUTOMATED: CPT | Performed by: PSYCHIATRY & NEUROLOGY

## 2017-10-24 PROCEDURE — 36415 COLL VENOUS BLD VENIPUNCTURE: CPT | Performed by: PSYCHIATRY & NEUROLOGY

## 2017-10-24 PROCEDURE — 74011250637 HC RX REV CODE- 250/637: Performed by: PSYCHIATRY & NEUROLOGY

## 2017-10-24 RX ORDER — LORAZEPAM 2 MG/ML
2 INJECTION INTRAMUSCULAR
Status: DISCONTINUED | OUTPATIENT
Start: 2017-10-24 | End: 2017-10-30 | Stop reason: ALTCHOICE

## 2017-10-24 RX ORDER — DIVALPROEX SODIUM 500 MG/1
1500 TABLET, EXTENDED RELEASE ORAL
Status: DISCONTINUED | OUTPATIENT
Start: 2017-10-24 | End: 2017-10-30 | Stop reason: ALTCHOICE

## 2017-10-24 RX ADMIN — FLUPHENAZINE HYDROCHLORIDE 10 MG: 5 TABLET, FILM COATED ORAL at 09:00

## 2017-10-24 RX ADMIN — FLUPHENAZINE HYDROCHLORIDE 10 MG: 5 TABLET, FILM COATED ORAL at 21:32

## 2017-10-24 RX ADMIN — DIVALPROEX SODIUM 750 MG: 500 TABLET, FILM COATED, EXTENDED RELEASE ORAL at 09:00

## 2017-10-24 RX ADMIN — DIPHENHYDRAMINE HYDROCHLORIDE 25 MG: 25 CAPSULE ORAL at 09:00

## 2017-10-24 RX ADMIN — CHLORPROMAZINE HYDROCHLORIDE 100 MG: 50 TABLET, SUGAR COATED ORAL at 09:00

## 2017-10-24 RX ADMIN — IBUPROFEN 400 MG: 400 TABLET, FILM COATED ORAL at 21:33

## 2017-10-24 RX ADMIN — CHLORPROMAZINE HYDROCHLORIDE 100 MG: 50 TABLET, SUGAR COATED ORAL at 21:31

## 2017-10-24 RX ADMIN — DIPHENHYDRAMINE HYDROCHLORIDE 25 MG: 25 CAPSULE ORAL at 21:32

## 2017-10-24 RX ADMIN — CHLORPROMAZINE HYDROCHLORIDE 100 MG: 50 TABLET, SUGAR COATED ORAL at 16:53

## 2017-10-24 RX ADMIN — DIVALPROEX SODIUM 1500 MG: 500 TABLET, FILM COATED, EXTENDED RELEASE ORAL at 21:32

## 2017-10-24 RX ADMIN — FLUPHENAZINE HYDROCHLORIDE 10 MG: 5 TABLET, FILM COATED ORAL at 16:53
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
## 2017-10-24 NOTE — PROGRESS NOTES
Problem: Falls - Risk of  Goal: *Absence of Falls  Document Kedar Fall Risk and appropriate interventions in the flowsheet. Outcome: Progressing Towards Goal  Fall Risk Interventions:    Patient in bed asleep. NAD. No falls noted/reported. Q 15 minute checks for safety maintained. Mobility Interventions: Assess mobility with egress test    Mentation Interventions: Adequate sleep, hydration, pain control    Medication Interventions: Assess postural VS orthostatic hypotension    Elimination Interventions:  Toilet paper/wipes in reach    History of Falls Interventions: Room close to nurse's station

## 2017-10-24 NOTE — BH NOTES
0550 - Pt labs not obtained this am. Several attempts to draw labs were unsuccessful by 2 different RNs. Pt states he has been drinking water. Encouraged to continue drinking the same.

## 2017-10-24 NOTE — PROGRESS NOTES
Problem: Psychosis  Goal: *STG: Remains safe in hospital  Outcome: Progressing Towards Goal  Pt continues to talk to himself in response to internal stimuli, but is better able to engage staff in conversation, when staff initiates. He allows blood draw. Problem: Falls - Risk of  Goal: *Absence of Falls  Document Kedar Fall Risk and appropriate interventions in the flowsheet. Fall Risk Interventions:  Elimination Interventions: Toilet paper/wipes in reach     Gait is steady. Pt is free from falls.

## 2017-10-24 NOTE — INTERDISCIPLINARY ROUNDS
Behavioral Health Interdisciplinary Rounds     Patient Name: Pat Tejeda  Age: 30 y.o.   Room/Bed:  730/01  Primary [facility]: [facility]   Admission Status: Involuntary Commitment and Forced Medication Order     Readmission within 30 days: no  Power of  in place: no  Patient requires a blocked bed: yes          Reason for blocked bed: disruptive behavior    VTE Prophylaxis: Not indicated  Flu vaccine given : no   Mobility needs/Fall risk: no    Nutritional Plan: no  Consults:          Labs/Testing due today?: yes    Sleep hours:  8.0      Participation in Care/Groups:  no  Medication Compliant?: Yes  PRNS (last 24 hours): None    Restraints (last 24 hours):  no  Substance Abuse:  no  CIWA (range last 24 hours):  COWS (range last 24 hours):   Alcohol screening (AUDIT) completed -  AUDIT Score: 0  If applicable, date SBIRT discussed in treatment team AND documented:   Tobacco - patient is a smoker: yes   Date tobacco education completed by RN: 10/11/17  24 hour chart check complete: yes     Patient goal(s) for today:    Treatment team focus/goals: Encourage patient to be more cooperative on unit  Progress note  Patient was cooperative in treatment team but not attending groups or participating in unit activities    LOS:  13  Expected LOS: TBD    Financial concerns/prescription coverage:    Date of last family contact:       Family requesting physician contact today:  No  Discharge plan: Home with parents  Guns in the home:  No       Outpatient provider(s):  [address]    Participating treatment team members: Dr. Marizol Horn; Aydee Baker; Justa Bray

## 2017-10-24 NOTE — BH NOTES
GROUP THERAPY PROGRESS NOTE    Aileen Pritchard did not participate in the Acute Unit's Process Group, with a focus identifying feelings, planning for the rest of the day, and discussing discharge.

## 2017-10-24 NOTE — PROGRESS NOTES
Problem: Psychosis  Goal: *STG: Decreased delusional thinking  Variance: Patient slowly responding  Pt. Remains preoccupied   Responding to internal stimuli    Goal: *STG: Participates in individual and group therapy  Variance: Patient Condition  Pt. Not attending groups   Pacing on unit  Goal: *STG/LTG: Complies with medication therapy  Outcome: Progressing Towards Goal  Pt. On court ordered medications     Compliant  Last night  Goal: Interventions  Will continue to monitor  On 15min. Checks for safety   Assess  thought process  Hallucinations   Medication compliance effectiveness       Encourage group participation    Problem: Falls - Risk of  Goal: *Absence of Falls  Document Kedar Fall Risk and appropriate interventions in the flowsheet. Fall Risk Interventions: non slip socks   Bed in low position    Mobility Interventions: Assess mobility with egress test    Mentation Interventions: Adequate sleep, hydration, pain control    Medication Interventions: Assess postural VS orthostatic hypotension    Elimination Interventions:  Toilet paper/wipes in reach    History of Falls Interventions: Room close to nurse's station

## 2017-10-24 NOTE — PROGRESS NOTES
Problem: Psychosis  Goal: *STG: Decreased hallucinations  Outcome: Not Progressing Towards Goal  Variance: Patient Condition    Pt is very preoccupied with internal stimuli, talking to himself. Problem: Falls - Risk of  Goal: *Absence of Falls  Document Kedar Fall Risk and appropriate interventions in the flowsheet. Fall Risk Interventions:  Elimination Interventions: Toilet paper/wipes in reach     Gait is steady. Pt is free from falls.

## 2017-10-25 PROCEDURE — 74011250637 HC RX REV CODE- 250/637: Performed by: PSYCHIATRY & NEUROLOGY

## 2017-10-25 PROCEDURE — 65220000003 HC RM SEMIPRIVATE PSYCH

## 2017-10-25 RX ORDER — CHLORPROMAZINE HYDROCHLORIDE 25 MG/ML
100 INJECTION INTRAMUSCULAR 3 TIMES DAILY
Status: DISCONTINUED | OUTPATIENT
Start: 2017-10-25 | End: 2017-10-27

## 2017-10-25 RX ORDER — CHLORPROMAZINE HYDROCHLORIDE 50 MG/1
150 TABLET, FILM COATED ORAL 3 TIMES DAILY
Status: DISCONTINUED | OUTPATIENT
Start: 2017-10-25 | End: 2017-10-27

## 2017-10-25 RX ADMIN — FLUPHENAZINE HYDROCHLORIDE 10 MG: 5 TABLET, FILM COATED ORAL at 21:30

## 2017-10-25 RX ADMIN — FLUPHENAZINE HYDROCHLORIDE 10 MG: 5 TABLET, FILM COATED ORAL at 17:10

## 2017-10-25 RX ADMIN — CHLORPROMAZINE HYDROCHLORIDE 150 MG: 50 TABLET, SUGAR COATED ORAL at 21:30

## 2017-10-25 RX ADMIN — CHLORPROMAZINE HYDROCHLORIDE 100 MG: 50 TABLET, SUGAR COATED ORAL at 09:11

## 2017-10-25 RX ADMIN — DIPHENHYDRAMINE HYDROCHLORIDE 25 MG: 25 CAPSULE ORAL at 09:12

## 2017-10-25 RX ADMIN — FLUPHENAZINE HYDROCHLORIDE 10 MG: 5 TABLET, FILM COATED ORAL at 09:11

## 2017-10-25 RX ADMIN — CHLORPROMAZINE HYDROCHLORIDE 150 MG: 50 TABLET, SUGAR COATED ORAL at 17:09

## 2017-10-25 RX ADMIN — DIVALPROEX SODIUM 1500 MG: 500 TABLET, FILM COATED, EXTENDED RELEASE ORAL at 21:30

## 2017-10-25 RX ADMIN — DIPHENHYDRAMINE HYDROCHLORIDE 25 MG: 25 CAPSULE ORAL at 21:30

## 2017-10-25 NOTE — PROGRESS NOTES
Problem: Psychosis   Meet by 10/31/17  Goal: *STG: Decreased delusional thinking  Outcome: Progressing Towards Goal  Pt responding to internal stimuli, talking and responding. Decreased paranoid delusions observed related to scheduled medications during evening. Goal: *STG: Remains safe in hospital  Outcome: Progressing Towards Goal  Pt remains safe in hospital on q 15 min safety checks. Verbalizes understanding of possible side effects of medication. Goal: *STG: Patient will develop strategies to regulate emotions and corresponding behaviors  Outcome: Progressing Towards Goal  Pt verbalizes positive coping skills/activities ex. \"going for walks, reading short stories\" pt more engaged during the evening. Goal: *STG/LTG: Complies with medication therapy  Outcome: Progressing Towards Goal  Pt compliant with scheduled medications and meals. Pt isolating in room during day; out for meals. Handout education provided to pt.         [address] [address]  Master Treatment Plan for Horris Heimlich    Date Treatment Plan Initiated: 10/25/17    Treatment Plan Modalities:  Type of Modality Amount  (x minutes) Frequency (x/week) Duration (x days) Name of Responsible Staff   [address] [place] [address] meetings to encourage peer interactions [facility]   Group psychotherapy to assist in building coping skills and internal controls 60 7 1 Karson Braun LCSW   Therapeutic activity groups to build coping skills 60 7 1 Karson Braun LCSW   Psychoeducation in group setting to address:   Medication education   15 7 1 Zenaida FERNANDO    Coping skills         Relaxation techniques         Symptom management         Discharge planning         Spirituality    60 2 [place]   60 1 1 Volunteer from [employer]   Recovery/AA/NA   61 3 1 Volunteer from [address] [place] [address] medication management   15 7 1 Dr. Merced Leslie

## 2017-10-25 NOTE — PROGRESS NOTES
Problem: Falls - Risk of  Goal: *Absence of Falls  Document Kedar Fall Risk and appropriate interventions in the flowsheet. Outcome: Progressing Towards Goal  Fall Risk Interventions:    Pt in bed asleep. NAD. No falls noted/reported. Q 15 minute checks for safety maintained. Mobility Interventions: Assess mobility with egress test    Mentation Interventions: Adequate sleep, hydration, pain control    Medication Interventions: Assess postural VS orthostatic hypotension    Elimination Interventions:  Toilet paper/wipes in reach    History of Falls Interventions: Room close to nurse's station

## 2017-10-25 NOTE — INTERDISCIPLINARY ROUNDS
Behavioral Health Interdisciplinary Rounds     Patient Name: Marcus Curry  Age: 30 y.o.   Room/Bed:  730/01  Primary Diagnosis: [facility]   Admission Status: Involuntary Commitment and Forced Medication Order     Readmission within 30 days: no  Power of  in place: no  Patient requires a blocked bed: yes          Reason for blocked bed: Disruptive Behavior    VTE Prophylaxis: Not indicated  Flu vaccine given : no   Mobility needs/Fall risk: no    Nutritional Plan: no  Consults:          Labs/Testing due today?: no    Sleep hours:7.5        Participation in Care/Groups:  no  Medication Compliant?: Yes  PRNS (last 24 hours): Pain    Restraints (last 24 hours):  no  Substance Abuse:  no  CIWA (range last 24 hours):  COWS (range last 24 hours):   Alcohol screening (AUDIT) completed -  AUDIT Score: 0  If applicable, date SBIRT discussed in treatment team AND documented:   Tobacco - patient is a smoker: yes   Date tobacco education completed by RN: 10/11/17  24 hour chart check complete: yes     Patient goal(s) for today:   Treatment team focus/goals:   Progress note     LOS:  14  Expected LOS:     Financial concerns/prescription coverage:    Date of last family contact:       Family requesting physician contact today:    Discharge plan:   Guns in the home:         Outpatient provider(s):     Participating treatment team members: Marcus Antoinette, * (assigned SW),
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
## 2017-10-25 NOTE — PROGRESS NOTES
Laboratory Monitoring for Divalproex/Valproic Acid: This patient is currently prescribed the following medication(s):   Current Facility-Administered Medications   Medication Dose Route Frequency    chlorproMAZINE (THORAZINE) tablet 150 mg  150 mg Oral TID    Or    chlorproMAZINE (THORAZINE) injection 100 mg  100 mg IntraMUSCular TID    divalproex ER (DEPAKOTE ER) 24 hour tablet 1,500 mg  1,500 mg Oral QHS    Or    LORazepam (ATIVAN) injection 2 mg  2 mg IntraMUSCular QHS    fluPHENAZine (PROLIXIN) injection 7.5 mg  7.5 mg IntraMUSCular TID    Or    fluPHENAZine (PROLIXIN) tablet 10 mg  10 mg Oral TID    diphenhydrAMINE (BENADRYL) capsule 25 mg  25 mg Oral BID    Or    diphenhydrAMINE (BENADRYL) injection 25 mg  25 mg IntraMUSCular BID     The following labs have been completed for monitoring of valproic acid:    Valproic Acid Serum Concentration  Lab Results   Component Value Date/Time    Valproic acid 81 10/24/2017 07:25 PM       Hepatic Function  Lab Results   Component Value Date/Time    Bilirubin, total 0.3 10/11/2017 04:35 PM    Protein, total 7.0 10/11/2017 04:35 PM    Albumin 3.5 10/11/2017 04:35 PM    Globulin 3.5 10/11/2017 04:35 PM    A-G Ratio 1.0 10/11/2017 04:35 PM    ALT (SGPT) 30 10/11/2017 04:35 PM    Alk. phosphatase 81 10/11/2017 04:35 PM     Hematology  Lab Results   Component Value Date/Time    WBC 6.3 10/24/2017 07:25 PM    RBC 5.09 10/24/2017 07:25 PM    HGB 15.2 10/24/2017 07:25 PM    HCT 44.4 10/24/2017 07:25 PM    MCV 87.2 10/24/2017 07:25 PM    MCH 29.9 10/24/2017 07:25 PM    MCHC 34.2 10/24/2017 07:25 PM    RDW 12.7 10/24/2017 07:25 PM    PLATELET 484 07/89/8059 07:25 PM     Assessment/Plan:  A valproic acid level was drawn on 10/24/17 @ 1925 and found to be 81 ug/mL. This level is reflective of a steady state trough and is considered low end of therapeutic range (generally ). Divalproex was converted to once daily dosing (1500mg ER nightly).

## 2017-10-25 NOTE — BH NOTES
GROUP THERAPY PROGRESS NOTE    Horris Heimlich did not participate in the Acute Unit's Process Group, with a focus identifying feelings, planning for the rest of the day, and discussing discharge.

## 2017-10-26 PROCEDURE — 74011250637 HC RX REV CODE- 250/637: Performed by: PSYCHIATRY & NEUROLOGY

## 2017-10-26 PROCEDURE — 65220000003 HC RM SEMIPRIVATE PSYCH

## 2017-10-26 RX ADMIN — FLUPHENAZINE HYDROCHLORIDE 10 MG: 5 TABLET, FILM COATED ORAL at 08:41

## 2017-10-26 RX ADMIN — CHLORPROMAZINE HYDROCHLORIDE 150 MG: 50 TABLET, SUGAR COATED ORAL at 22:21

## 2017-10-26 RX ADMIN — CHLORPROMAZINE HYDROCHLORIDE 150 MG: 50 TABLET, SUGAR COATED ORAL at 08:41

## 2017-10-26 RX ADMIN — CHLORPROMAZINE HYDROCHLORIDE 150 MG: 50 TABLET, SUGAR COATED ORAL at 15:56

## 2017-10-26 RX ADMIN — FLUPHENAZINE HYDROCHLORIDE 10 MG: 5 TABLET, FILM COATED ORAL at 15:56

## 2017-10-26 RX ADMIN — DIPHENHYDRAMINE HYDROCHLORIDE 25 MG: 25 CAPSULE ORAL at 08:41

## 2017-10-26 RX ADMIN — DIVALPROEX SODIUM 1500 MG: 500 TABLET, FILM COATED, EXTENDED RELEASE ORAL at 22:22

## 2017-10-26 RX ADMIN — DIPHENHYDRAMINE HYDROCHLORIDE 25 MG: 25 CAPSULE ORAL at 22:21

## 2017-10-26 RX ADMIN — FLUPHENAZINE HYDROCHLORIDE 10 MG: 5 TABLET, FILM COATED ORAL at 22:21

## 2017-10-26 NOTE — BH NOTES
GROUP THERAPY PROGRESS NOTE    Mary Boards did not participate in the Acute Unit's Process Group, with a focus identifying feelings, planning for the rest of the day, and discussing discharge.

## 2017-10-26 NOTE — BH NOTES
GROUP THERAPY PROGRESS NOTE    Jessica Arizmendi is participating in Leisure-[employer]ive Group. Group time: 15 minutes    Personal goal for participation: n/a    Goal orientation: relaxation    Group therapy participation: active    Therapeutic interventions reviewed and discussed: Review of unit guidelines and socializing appropriately with peers.     Impression of participation: active

## 2017-10-26 NOTE — INTERDISCIPLINARY ROUNDS
Behavioral Health Interdisciplinary Rounds     Patient Name: Kenrick Way  Age: 30 y.o. Room/Bed:  730/01  Primary Diagnosis: [facility]   Admission Status: Involuntary Commitment and Forced Medication Order     Readmission within 30 days: no  Power of  in place: no  Patient requires a blocked bed: yes          Reason for blocked bed: disruptive    VTE Prophylaxis: Not indicated  Flu vaccine given : no   Mobility needs/Fall risk: no    Nutritional Plan: no  Consults:          Labs/Testing due today?: no    Sleep hours:  7.75      Participation in Care/Groups:  no  Medication Compliant?: Yes  PRNS (last 24 hours): None    Restraints (last 24 hours):  no  Substance Abuse:  no  CIWA (range last 24 hours):  COWS (range last 24 hours):   Alcohol screening (AUDIT) completed -  AUDIT Score: 0  If applicable, date SBIRT discussed in treatment team AND documented:   Tobacco - patient is a smoker: yes   Date tobacco education completed by RN: 10/11/17  24 hour chart check complete: yes     Patient goal(s) for today:   Treatment team focus/goals: Plan to continue to titrate his medications. Progress note He continues to be very distracted by this voices. He was able to participate in treatment team , but refuses to answer questions regarding medications. He has been complaint with his medications.      LOS:  15  Expected LOS: TBD     Financial concerns/prescription coverage:  Date of last family contact:       Family requesting physician contact today:    Discharge plan: He will return home with his parents   Guns in the home:   no      Outpatient provider(s): [address] [place] [address] team     Participating treatment team members: Kasi Zazueta, ABDULLAHI

## 2017-10-26 NOTE — BH NOTES
Patient was noted to be asleep during rounds as respiration was even and unlabored as chest was rising and falling. Will continue to monitor throughout shift for safety.

## 2017-10-26 NOTE — PROGRESS NOTES
Problem: Psychosis  Goal: *STG: Decreased hallucinations  Pt appears less internally stimulated. Goal: *STG: Remains safe in hospital  Outcome: Progressing Towards Goal  Denies SI/HI. Remains on q 15 min safety checks. Goal: *STG: Seeks staff when feelings of self harm or harm towards others arise  Outcome: Progressing Towards Goal  Pt denies any thoughts to harm himself or others. Goal: *STG: Patient will verbalize areas in need of boundary recognition and limit setting  Variance: Patient Condition    Pt has improved with physical boundaries, however does not verbalize it. Goal: *STG/LTG: Complies with medication therapy  Outcome: Progressing Towards Goal  Pt compliant with meds. Took meds without encouragement. Goal: *STG/LTG: Demonstrates improved thought patterns as evidenced by logical and coherent speech  Pt is minimal with conversation, unable to assess. Goal: *STG/LTG: Demonstrates improved social functioning by responding appropriately to staff  Pt remains isolative to his room much of this morning. Problem: Falls - Risk of  Goal: *Absence of Falls  Document Kedar Fall Risk and appropriate interventions in the flowsheet. Outcome: Progressing Towards Goal  Fall Risk Interventions:  Mobility Interventions: Assess mobility with egress test    Mentation Interventions: Adequate sleep, hydration, pain control    Medication Interventions: Teach patient to arise slowly    Elimination Interventions: Toilet paper/wipes in reach    History of Falls Interventions: Room close to nurse's station        Problem: Suicide/Homicide (Adult/Pediatric)  Goal: *STG: Remains safe in hospital  Outcome: Progressing Towards Goal  Continues on q 15 min safety checks. Denies SI/HI. Goal: *STG: Attends activities and groups  Outcome: Not Progressing Towards Goal  Pt's inconsistent with attending groups.  Isolative to his room this am.   Goal: *STG:  Verbalizes alternative ways of dealing with maladaptive feelings/behaviors  Outcome: Not Progressing Towards Goal  None verbalized. Pt's minimal with conversation with staff.    Variance: Patient Condition
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
## 2017-10-26 NOTE — PROGRESS NOTES
10/25/17 2023   Vital Signs   Temp 97.9 °F (36.6 °C)   Temp Source Oral   Pulse (Heart Rate) (!) 121   Resp Rate 14   Level of Consciousness Alert   BP (!) 89/59   MAP (Calculated) 69   MEWS Score 3   Patient runs low after rising. Giving snack and fluids.  Will reassess
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
## 2017-10-27 PROCEDURE — 74011250636 HC RX REV CODE- 250/636: Performed by: PSYCHIATRY & NEUROLOGY

## 2017-10-27 PROCEDURE — 74011250637 HC RX REV CODE- 250/637: Performed by: PSYCHIATRY & NEUROLOGY

## 2017-10-27 PROCEDURE — 65220000001 HC RM PRIVATE PSYCH

## 2017-10-27 RX ORDER — CHLORPROMAZINE HYDROCHLORIDE 25 MG/ML
50 INJECTION INTRAMUSCULAR 3 TIMES DAILY
Status: DISCONTINUED | OUTPATIENT
Start: 2017-10-27 | End: 2017-11-03 | Stop reason: HOSPADM

## 2017-10-27 RX ORDER — CHLORPROMAZINE HYDROCHLORIDE 25 MG/ML
100 INJECTION INTRAMUSCULAR 3 TIMES DAILY
Status: DISCONTINUED | OUTPATIENT
Start: 2017-10-27 | End: 2017-10-27

## 2017-10-27 RX ORDER — FLUPHENAZINE HYDROCHLORIDE 1 MG/1
2 TABLET ORAL 3 TIMES DAILY
Status: DISCONTINUED | OUTPATIENT
Start: 2017-10-27 | End: 2017-10-27

## 2017-10-27 RX ORDER — OLANZAPINE 5 MG/1
10 TABLET, ORALLY DISINTEGRATING ORAL 3 TIMES DAILY
Status: DISCONTINUED | OUTPATIENT
Start: 2017-10-27 | End: 2017-11-03 | Stop reason: HOSPADM

## 2017-10-27 RX ORDER — CHLORPROMAZINE HYDROCHLORIDE 50 MG/1
200 TABLET, FILM COATED ORAL 3 TIMES DAILY
Status: DISCONTINUED | OUTPATIENT
Start: 2017-10-27 | End: 2017-10-27

## 2017-10-27 RX ORDER — FLUPHENAZINE DECANOATE 25 MG/ML
50 INJECTION, SOLUTION INTRAMUSCULAR; SUBCUTANEOUS ONCE
Status: DISCONTINUED | OUTPATIENT
Start: 2017-10-27 | End: 2017-10-27 | Stop reason: SDUPTHER

## 2017-10-27 RX ORDER — FLUPHENAZINE DECANOATE 25 MG/ML
50 INJECTION, SOLUTION INTRAMUSCULAR; SUBCUTANEOUS ONCE
Status: COMPLETED | OUTPATIENT
Start: 2017-10-27 | End: 2017-10-27

## 2017-10-27 RX ORDER — FLUPHENAZINE HYDROCHLORIDE 2.5 MG/ML
7.5 INJECTION, SOLUTION INTRAMUSCULAR 3 TIMES DAILY
Status: DISCONTINUED | OUTPATIENT
Start: 2017-10-27 | End: 2017-10-27

## 2017-10-27 RX ADMIN — DIVALPROEX SODIUM 1500 MG: 500 TABLET, FILM COATED, EXTENDED RELEASE ORAL at 21:02

## 2017-10-27 RX ADMIN — DIPHENHYDRAMINE HYDROCHLORIDE 25 MG: 25 CAPSULE ORAL at 09:20

## 2017-10-27 RX ADMIN — OLANZAPINE 10 MG: 5 TABLET, ORALLY DISINTEGRATING ORAL at 15:29

## 2017-10-27 RX ADMIN — FLUPHENAZINE DECANOATE 50 MG: 25 INJECTION, SOLUTION INTRAMUSCULAR; SUBCUTANEOUS at 15:39

## 2017-10-27 RX ADMIN — FLUPHENAZINE HYDROCHLORIDE 10 MG: 5 TABLET, FILM COATED ORAL at 09:20

## 2017-10-27 RX ADMIN — OLANZAPINE 10 MG: 5 TABLET, ORALLY DISINTEGRATING ORAL at 21:02

## 2017-10-27 RX ADMIN — CHLORPROMAZINE HYDROCHLORIDE 150 MG: 50 TABLET, SUGAR COATED ORAL at 09:20

## 2017-10-27 RX ADMIN — DIPHENHYDRAMINE HYDROCHLORIDE 25 MG: 25 CAPSULE ORAL at 21:02

## 2017-10-27 NOTE — BH NOTES
LEISURE GROUP THERAPY PROGRESS NOTE    The patient Sarah Lopez a 30 y.o. male is participating in Leisure Group. Group time: 45 minutes    Personal goal for participation: Daily social interactions with other peers & staff.     Goal orientation: Relaxation activities    Group therapy participation: active    Therapeutic interventions reviewed and discussed:  Yes    Impression of participation: Participated    Cheyenne Gerardo  10/26/2017  9:19 PM

## 2017-10-27 NOTE — BH NOTES
GROUP THERAPY PROGRESS NOTE    Mary Boards did not participate in a Process Group on the Acute Unit with a focus identifying feelings, planning for the day, and reviewing DBT coping skills related to distress management.

## 2017-10-27 NOTE — INTERDISCIPLINARY ROUNDS
Behavioral Health Interdisciplinary Rounds     Patient Name: Aissatou Levine  Age: 30 y.o. Room/Bed:  730/01  Primary Diagnosis: Schizoaffective disorder ([facility] [address]   Admission Status: Involuntary Commitment and Forced Medication Order     Readmission within 30 days: no  Power of  in place: no  Patient requires a blocked bed: yes          Reason for blocked bed: disruptive    VTE Prophylaxis: Not indicated  Flu vaccine given : no   Mobility needs/Fall risk: no    Nutritional Plan: no  Consults:          Labs/Testing due today?: no    Sleep hours: 7.75       Participation in Care/Groups:  no  Medication Compliant?: Yes  PRNS (last 24 hours): None    Restraints (last 24 hours):  no  Substance Abuse:  no  CIWA (range last 24 hours):  COWS (range last 24 hours):   Alcohol screening (AUDIT) completed -  AUDIT Score: 0  If applicable, date SBIRT discussed in treatment team AND documented:   Tobacco - patient is a smoker: yes   Date tobacco education completed by RN: 10/11/17  24 hour chart check complete: yes     Patient goal(s) for today:   Treatment team focus/goals: Plan to titrate his medications. Progress note - he continues to have no insight into his illness and the need for medications.       LOS:  16  Expected LOS: TBD     Financial concerns/prescription coverage:    Date of last family contact:      Family requesting physician contact today:    Discharge plan: he will return home with his parents   Guns in the home:  no       Outpatient provider(s):[facility] team      Participating treatment team members: Evelin Santoyo- Sheldon Gonsalez, PharmD. - Dada Miller

## 2017-10-27 NOTE — PROGRESS NOTES
Problem: Psychosis  Goal: *STG: Decreased hallucinations  Pt c/o intermittent hallucinations. Goal: *STG: Remains safe in hospital  Outcome: Progressing Towards Goal  Pt denies any suicidal or homicidal thoughts. Contracts for safety. Remains on q 15 min safety checks. Goal: *STG: Seeks staff when feelings of self harm or harm towards others arise  Outcome: Progressing Towards Goal  Pt more interactive with staff today. Encouraged to let staff know if he is feeling anxious. Goal: *STG: Patient will develop strategies to regulate emotions and corresponding behaviors  Coping skills reviewed. Limited insight. Goal: *STG: Participates in individual and group therapy  Pt isolative to room most of the morning, however more talkative when out in the milieu. Problem: Falls - Risk of  Goal: *Absence of Falls  Document Kedar Fall Risk and appropriate interventions in the flowsheet. Outcome: Progressing Towards Goal  Fall Risk Interventions:  Mobility Interventions: Assess mobility with egress test    Mentation Interventions: Adequate sleep, hydration, pain control    Medication Interventions: Teach patient to arise slowly    Elimination Interventions: Toilet paper/wipes in reach    History of Falls Interventions: Room close to nurse's station        Problem: Suicide/Homicide (Adult/Pediatric)  Goal: *STG: Attends activities and groups  Variance: Patient ConditionPt's inconsistent with attending groups. Attendance encouraged.

## 2017-10-27 NOTE — BH NOTES
In bed asleep at start of shift with respirations even and unlabored as chest was rising and falling, Will continue to monitor throughout for safety,

## 2017-10-27 NOTE — PROGRESS NOTES
Problem: Psychosis  Goal: *STG/LTG: Complies with medication therapy  Outcome: Progressing Towards Goal  Pt took prolixin deconate and zyprexa as ordered
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
## 2017-10-28 PROCEDURE — 65220000001 HC RM PRIVATE PSYCH

## 2017-10-28 PROCEDURE — 74011250637 HC RX REV CODE- 250/637: Performed by: PSYCHIATRY & NEUROLOGY

## 2017-10-28 RX ADMIN — DIPHENHYDRAMINE HYDROCHLORIDE 25 MG: 25 CAPSULE ORAL at 09:26

## 2017-10-28 RX ADMIN — OLANZAPINE 10 MG: 5 TABLET, ORALLY DISINTEGRATING ORAL at 09:26

## 2017-10-28 RX ADMIN — OLANZAPINE 10 MG: 5 TABLET, ORALLY DISINTEGRATING ORAL at 16:07

## 2017-10-28 RX ADMIN — LORAZEPAM 1 MG: 1 TABLET ORAL at 11:08

## 2017-10-28 RX ADMIN — DIVALPROEX SODIUM 1500 MG: 500 TABLET, FILM COATED, EXTENDED RELEASE ORAL at 21:15

## 2017-10-28 RX ADMIN — DIPHENHYDRAMINE HYDROCHLORIDE 25 MG: 25 CAPSULE ORAL at 21:15

## 2017-10-28 RX ADMIN — IBUPROFEN 400 MG: 400 TABLET, FILM COATED ORAL at 21:39

## 2017-10-28 RX ADMIN — OLANZAPINE 10 MG: 5 TABLET, ORALLY DISINTEGRATING ORAL at 21:15

## 2017-10-28 RX ADMIN — OLANZAPINE 5 MG: 5 TABLET, FILM COATED ORAL at 11:08
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
## 2017-10-28 NOTE — INTERDISCIPLINARY ROUNDS
Behavioral Health Interdisciplinary Rounds     Patient Name: Lizy Johnson  Age: 30 y.o. Room/Bed:  724/  Primary Diagnosis: [facility] ([facility]   Admission Status: Involuntary Commitment and Forced Medication Order     Readmission within 30 days: no  Power of  in place: no  Patient requires a blocked bed: yes          Reason for blocked bed: disruptive    VTE Prophylaxis: Not indicated  Flu vaccine given : no   Mobility needs/Fall risk: no    Nutritional Plan: no  Consults:          Labs/Testing due today?: no    Sleep hours:  6.5      Participation in Care/Groups:  no  Medication Compliant?: Yes  PRNS (last 24 hours): None    Restraints (last 24 hours):  no  Substance Abuse:  no  CIWA (range last 24 hours):  COWS (range last 24 hours):   Alcohol screening (AUDIT) completed -  AUDIT Score: 0  If applicable, date SBIRT discussed in treatment team AND documented:   Tobacco - patient is a smoker: yes   Date tobacco education completed by RN: 10-11-17  24 hour chart check complete: yes     Patient goal(s) for today: Meet with Tx team to review goals and attend unit activities and groups  Treatment team focus/goals: Eval meds. Progress note : Engaging, improved / organized thinking and feeling more friendly towards peers.     LOS:  17  Expected LOS:     Financial concerns/prescription coverage:    Date of last family contact:       Family requesting physician contact today:    Discharge plan: Return home  Guns in the home: No      Outpatient provider(s): Bhargavi ROBINSB/ IC Team     Participating treatment team members: Lizy Johnson, Dr Michael Grubbs RN and Lorenzo GARRIDO

## 2017-10-28 NOTE — PROGRESS NOTES
Problem: Falls - Risk of  Goal: *Absence of Falls  Document Kedar Fall Risk and appropriate interventions in the flowsheet. Outcome: Progressing Towards Goal  Fall Risk Interventions:  Mobility Interventions: Assess mobility with egress test    Mentation Interventions: Adequate sleep, hydration, pain control    Medication Interventions: Teach patient to arise slowly    Elimination Interventions: Toilet paper/wipes in reach    History of Falls Interventions: Room close to nurse's station    Resting in bed with eyes closed, no complaints, no distress noted. Safety measures in place, 15 minute checks, will continue to monitor.

## 2017-10-28 NOTE — BH NOTES
PRN Medication Documentation    Specific patient behavior that led to need for PRN medication: Patient thinks that bugs are crawling in on him. Patient trying to scrape them off.   Staff interventions attempted prior to PRN being given: Reorient pt  PRN medication given: PO Ativan 1 mg and Zyprexa 5 mg  Patient response/effectiveness of PRN medication: pending

## 2017-10-28 NOTE — PROGRESS NOTES
Problem: Psychosis  Goal: *STG: Decreased delusional thinking  Outcome: Progressing Towards Goal  Pt. Met in treatment team with Dr. Shelby Freeman  Less paranoid    But states he feels like bugs are all over him     Rubbing bugs off of him  Requiring  Prn zyprexa  And ativan   Goal: *STG/LTG: Complies with medication therapy  Outcome: Progressing Towards Goal  Pt. Medication compliant      Reviewed in treatment team      Goal: *STG/LTG: Demonstrates improved social functioning by responding appropriately to staff  Outcome: Progressing Towards Goal  Pt. Spending time out of his room   Smiling  Social with select female peers   Goal: Interventions  Variance: Patient slowly responding     Will continue to monitor   On 15 min. Checks for safety   Assess thought process    Delusional thinking   Medication compliance  Effectiveness       Encourage group  Participation       Problem: Falls - Risk of  Goal: *Absence of Falls  Document Kedar Fall Risk and appropriate interventions in the flowsheet.    Fall Risk Interventions:  Non slip socks   Bed in low position   Mobility Interventions: Assess mobility with egress test    Mentation Interventions: Adequate sleep, hydration, pain control    Medication Interventions: Teach patient to arise slowly    Elimination Interventions: Call light in reach    History of Falls Interventions: Room close to nurse's station

## 2017-10-29 LAB
ALBUMIN SERPL-MCNC: 3.2 G/DL (ref 3.5–5)
ALBUMIN/GLOB SERPL: 0.9 {RATIO} (ref 1.1–2.2)
ALP SERPL-CCNC: 75 U/L (ref 45–117)
ALT SERPL-CCNC: 90 U/L (ref 12–78)
ANION GAP SERPL CALC-SCNC: 8 MMOL/L (ref 5–15)
AST SERPL-CCNC: 77 U/L (ref 15–37)
BILIRUB SERPL-MCNC: 0.3 MG/DL (ref 0.2–1)
BUN SERPL-MCNC: 11 MG/DL (ref 6–20)
BUN/CREAT SERPL: 10 (ref 12–20)
CALCIUM SERPL-MCNC: 8.4 MG/DL (ref 8.5–10.1)
CHLORIDE SERPL-SCNC: 106 MMOL/L (ref 97–108)
CO2 SERPL-SCNC: 26 MMOL/L (ref 21–32)
CREAT SERPL-MCNC: 1.1 MG/DL (ref 0.7–1.3)
GLOBULIN SER CALC-MCNC: 3.6 G/DL (ref 2–4)
GLUCOSE SERPL-MCNC: 87 MG/DL (ref 65–100)
POTASSIUM SERPL-SCNC: 4.2 MMOL/L (ref 3.5–5.1)
PROT SERPL-MCNC: 6.8 G/DL (ref 6.4–8.2)
SODIUM SERPL-SCNC: 140 MMOL/L (ref 136–145)
VALPROATE SERPL-MCNC: 68 UG/ML (ref 50–100)

## 2017-10-29 PROCEDURE — 80164 ASSAY DIPROPYLACETIC ACD TOT: CPT | Performed by: PSYCHIATRY & NEUROLOGY

## 2017-10-29 PROCEDURE — 80053 COMPREHEN METABOLIC PANEL: CPT | Performed by: PSYCHIATRY & NEUROLOGY

## 2017-10-29 PROCEDURE — 74011250637 HC RX REV CODE- 250/637: Performed by: PSYCHIATRY & NEUROLOGY

## 2017-10-29 PROCEDURE — 36415 COLL VENOUS BLD VENIPUNCTURE: CPT | Performed by: PSYCHIATRY & NEUROLOGY

## 2017-10-29 PROCEDURE — 65220000001 HC RM PRIVATE PSYCH

## 2017-10-29 RX ORDER — HYDROXYZINE 25 MG/1
25 TABLET, FILM COATED ORAL
Status: DISCONTINUED | OUTPATIENT
Start: 2017-10-29 | End: 2017-10-30

## 2017-10-29 RX ADMIN — OLANZAPINE 5 MG: 5 TABLET, FILM COATED ORAL at 20:11

## 2017-10-29 RX ADMIN — OLANZAPINE 10 MG: 5 TABLET, ORALLY DISINTEGRATING ORAL at 09:25

## 2017-10-29 RX ADMIN — DIPHENHYDRAMINE HYDROCHLORIDE 25 MG: 25 CAPSULE ORAL at 09:25

## 2017-10-29 RX ADMIN — OLANZAPINE 10 MG: 5 TABLET, ORALLY DISINTEGRATING ORAL at 21:09

## 2017-10-29 RX ADMIN — ZOLPIDEM TARTRATE 10 MG: 10 TABLET ORAL at 21:11

## 2017-10-29 RX ADMIN — ACETAMINOPHEN 650 MG: 325 TABLET, FILM COATED ORAL at 21:12

## 2017-10-29 RX ADMIN — DIVALPROEX SODIUM 1500 MG: 500 TABLET, FILM COATED, EXTENDED RELEASE ORAL at 21:09

## 2017-10-29 RX ADMIN — OLANZAPINE 10 MG: 5 TABLET, ORALLY DISINTEGRATING ORAL at 16:42

## 2017-10-29 RX ADMIN — IBUPROFEN 400 MG: 400 TABLET, FILM COATED ORAL at 17:51

## 2017-10-29 RX ADMIN — DIPHENHYDRAMINE HYDROCHLORIDE 25 MG: 25 CAPSULE ORAL at 20:11

## 2017-10-29 RX ADMIN — ZOLPIDEM TARTRATE 10 MG: 10 TABLET ORAL at 00:32

## 2017-10-29 NOTE — PROGRESS NOTES
Problem: Psychosis  Goal: *STG: Decreased delusional thinking  Outcome: Progressing Towards Goal  Pt verbalizes paranoid thinking. No overt anger. Complies with meds.

## 2017-10-29 NOTE — PROGRESS NOTES
Problem: Psychosis  Goal: *STG: Remains safe in hospital  Outcome: Progressing Towards Goal  Pt received on shift asleep in bed. Continues on q15 min checks for safety in blocked room 724-01 close to the nurses station. Will continue to monitor and assess pt.    0030  Pt up on unit, restless. Returned to room and standing and looking out window. Appears preoccupied. Given water per his request.  Agreed to be medicated to promote sleep. VS obtained. PRN Medication Documentation    Specific patient behavior that led to need for PRN medication:   Pt restless, staring out window  Staff interventions attempted prior to PRN being given:  Pt had been in room resting  PRN medication given:   0032  Ambien 10 mg po administered to promote sleep  Patient response/effectiveness of PRN medication: will reassess    0100  Asleep in bed.  0145  Continues asleep in bed. Ambien effective. 5185  Labs drawn and sent. Pt verbalized no complaints. Pt cooperative.   Provided with water and snacks per his request.

## 2017-10-29 NOTE — BH NOTES
1752; Prn ibuprofen 400 mg given for 5/10 bilateral foot pain. Will reassess within the hour. 1850: Pain is currently 0.

## 2017-10-29 NOTE — BH NOTES
Improved eye contact compared to yesterday  Out in [facility] most of shift  Low energy  Euthymic mood
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
## 2017-10-29 NOTE — INTERDISCIPLINARY ROUNDS
Behavioral Health Interdisciplinary Rounds     Patient Name: Sudarshan Shukla  Age: 30 y.o. Room/Bed:  724/  Primary Diagnosis: [facility] ([facility]   Admission Status: Involuntary Commitment and Forced Medication Order     Readmission within 30 days: no  Power of  in place: no  Patient requires a blocked bed: yes          Reason for blocked bed:  Disruptive behavior    VTE Prophylaxis: Not indicated  Flu vaccine given : no   Mobility needs/Fall risk: no    Nutritional Plan: no  Consults:          Labs/Testing due today?: yes - Depakote and CMP ---->  LABS DRAWN    Sleep hours:  6 3/4 hrs      Participation in Care/Groups:  no  Medication Compliant?: Yes  PRNS (last 24 hours):  Antipsychotic (PO), Antianxiety and Pain    Restraints (last 24 hours):  no  Substance Abuse:  no  CIWA (range last 24 hours):  COWS (range last 24 hours):   Alcohol screening (AUDIT) completed -  AUDIT Score: 0  If applicable, date SBIRT discussed in treatment team AND documented:   Tobacco - patient is a smoker: yes   Date tobacco education completed by RN: 10/11/17  24 hour chart check complete: yes     Patient goal(s) for today:   Treatment team focus/goals:   Progress note     LOS:  18  Expected LOS:     Financial concerns/prescription coverage:    Date of last family contact:       Family requesting physician contact today:    Discharge plan:   Guns in the home:         Outpatient provider(s):     Participating treatment team members: Sudarshan Vazquez, * (assigned SW),

## 2017-10-29 NOTE — PROGRESS NOTES
Problem: Psychosis  Goal: *STG: Decreased delusional thinking  Outcome: Progressing Towards Goal  Pt. Met with Dr. Daniella Villa in treatment team  Able to share thoughts    Goal: *STG/LTG: Complies with medication therapy  Outcome: Progressing Towards Goal  Court ordered medications   Reviewed in treatment team  Pt. compliant  Goal: *STG/LTG: Demonstrates improved thought patterns as evidenced by logical and coherent speech  Variance: Patient slowly responding  Pt.   Continues to have some delusional thinking   Stated bugs were on him yesterday   Required prn medications

## 2017-10-29 NOTE — PROGRESS NOTES
Problem: Psychosis  Goal: *STG: Remains safe in hospital  Outcome: Progressing Towards Goal  Pt demonstrates no acting out behaviors. Goal: *STG: Patient will verbalize areas in need of boundary recognition and limit setting  Outcome: Not Progressing Towards Goal  Variance: Other, Pt is flirtatious with a select female peer. Goal: *STG/LTG: Demonstrates improved thought patterns as evidenced by logical and coherent speech  Outcome: Progressing Towards Goal  Pt is able to verbalize his needs appropriately. Problem: Falls - Risk of  Goal: *Absence of Falls  Document Kedar Fall Risk and appropriate interventions in the flowsheet. Outcome: Progressing Towards Goal  Fall Risk Interventions:  Pt's gait is steady. Pt is free from falls.     Mentation Interventions: Adequate sleep, hydration, pain control      History of Falls Interventions: Room close to nurse's station  8598  Ambien, 10 mg given per pt request, to promote rest.

## 2017-10-30 PROCEDURE — 65220000001 HC RM PRIVATE PSYCH

## 2017-10-30 PROCEDURE — 74011250637 HC RX REV CODE- 250/637: Performed by: PSYCHIATRY & NEUROLOGY

## 2017-10-30 RX ORDER — LORAZEPAM 1 MG/1
1 TABLET ORAL
Status: DISCONTINUED | OUTPATIENT
Start: 2017-10-30 | End: 2017-11-03 | Stop reason: HOSPADM

## 2017-10-30 RX ORDER — HYDROXYZINE 25 MG/1
25 TABLET, FILM COATED ORAL
Status: DISCONTINUED | OUTPATIENT
Start: 2017-10-30 | End: 2017-11-02

## 2017-10-30 RX ADMIN — DIPHENHYDRAMINE HYDROCHLORIDE 25 MG: 25 CAPSULE ORAL at 10:55

## 2017-10-30 RX ADMIN — OLANZAPINE 10 MG: 5 TABLET, ORALLY DISINTEGRATING ORAL at 21:05

## 2017-10-30 RX ADMIN — DIPHENHYDRAMINE HYDROCHLORIDE 25 MG: 25 CAPSULE ORAL at 21:05

## 2017-10-30 RX ADMIN — OLANZAPINE 10 MG: 5 TABLET, ORALLY DISINTEGRATING ORAL at 15:55

## 2017-10-30 RX ADMIN — OLANZAPINE 10 MG: 5 TABLET, ORALLY DISINTEGRATING ORAL at 10:55

## 2017-10-30 NOTE — BH NOTES
2111  Tylenol, 2 tabs given per pt request for c/o generalized discomfort. 2153 Pt is resting quietly. No further c/o discomfort.

## 2017-10-30 NOTE — PROGRESS NOTES
Problem: Psychosis  Goal: *STG: Decreased hallucinations  Outcome: Progressing Towards Goal  According to patient the visual hallucinations have decreased. He is also coming to the nurses when they do occur. This evening he stated there were a lot of people in his room with conflict. He requested medication. He is currently sleeping. No stress noted.

## 2017-10-30 NOTE — PROGRESS NOTES
Problem: Psychosis  Goal: *STG/LTG: Complies with medication therapy  Outcome: Progressing Towards Goal  Pt has been taking all scheduled medications. He required PRN for \" people making noises \" in his room on prior shift. Pt has been appropriate in interactions with staff and peers.

## 2017-10-30 NOTE — INTERDISCIPLINARY ROUNDS
Behavioral Health Interdisciplinary Rounds     Patient Name: Augustin Ferguson  Age: 30 y.o. Room/Bed:  724/  Primary Diagnosis: Schizoaffective disorder (HCC)   Admission Status: Involuntary Commitment     Readmission within 30 days: no  Power of  in place: no  Patient requires a blocked bed: yes          Reason for blocked bed: severe visual hallucinations    VTE Prophylaxis: No  Flu vaccine given : no   Mobility needs/Fall risk: no    Nutritional Plan: no  Consults:          Labs/Testing due today?: no    Sleep hours:  6.0      Participation in Care/Groups:  yes  Medication Compliant?: Yes  PRNS (last 24 hours): Antipsychotic (IM), Sleep Aid and Pain    Restraints (last 24 hours):  no  Substance Abuse:  no  CIWA (range last 24 hours):  COWS (range last 24 hours):   Alcohol screening (AUDIT) completed -  AUDIT Score: 0  If applicable, date SBIRT discussed in treatment team AND documented:   Tobacco - patient is a smoker:   Date tobacco education completed by RN:24 hour chart check complete: yes     Patient goal(s) for today: Patient would like to wear street clothes rather than hospital gowns  Treatment team focus/goals: Seek staff out when having feelings of self harm or harm towards others  Progress note  Patient able to focus and express himself more clearly. Is developing a plan for self care when discharged.     LOS:  19  Expected LOS: TBD    Financial concerns/prescription coverage:  no  Date of last family contact:       Family requesting physician contact today:  no  Discharge plan:  Return to home with father  Guns in the home:  No       Outpatient provider(s): [facility] ICT Team    Participating treatment team members: Dr. Cash Keller; Carlos Manuel Serrano; Xiang Cancino

## 2017-10-30 NOTE — PROGRESS NOTES
Laboratory Monitoring for Divalproex/Valproic Acid: This patient is currently prescribed the following medication(s):   Current Facility-Administered Medications   Medication Dose Route Frequency    OLANZapine (ZyPREXA zydis) disintegrating tablet 10 mg  10 mg Oral TID    Or    chlorproMAZINE (THORAZINE) injection 50 mg  50 mg IntraMUSCular TID    divalproex ER (DEPAKOTE ER) 24 hour tablet 1,500 mg  1,500 mg Oral QHS    Or    LORazepam (ATIVAN) injection 2 mg  2 mg IntraMUSCular QHS    diphenhydrAMINE (BENADRYL) capsule 25 mg  25 mg Oral BID    Or    diphenhydrAMINE (BENADRYL) injection 25 mg  25 mg IntraMUSCular BID     The following labs have been completed for monitoring of valproic acid:    Valproic Acid Serum Concentration  Lab Results   Component Value Date/Time    Valproic acid 68 10/29/2017 06:48 AM       Previous Valproic Acid Levels:  Date/Time Type of Level Level (mmol/L) Pre-Level Dose Post-Level Dose   Barri@[employer] Steady State Trough 81 750mg ER BID 1500mg ER QHS   10/29 @ 8148 ~9.5 hours post dose 68 1500mg ER QHS 1500mg ER QHS     Hepatic Function  Lab Results   Component Value Date/Time    Bilirubin, total 0.3 10/29/2017 06:48 AM    Protein, total 6.8 10/29/2017 06:48 AM    Albumin 3.2 10/29/2017 06:48 AM    Globulin 3.6 10/29/2017 06:48 AM    A-G Ratio 0.9 10/29/2017 06:48 AM    ALT (SGPT) 90 10/29/2017 06:48 AM    Alk. phosphatase 75 10/29/2017 06:48 AM     Hematology  Lab Results   Component Value Date/Time    WBC 6.3 10/24/2017 07:25 PM    RBC 5.09 10/24/2017 07:25 PM    HGB 15.2 10/24/2017 07:25 PM    HCT 44.4 10/24/2017 07:25 PM    MCV 87.2 10/24/2017 07:25 PM    MCH 29.9 10/24/2017 07:25 PM    MCHC 34.2 10/24/2017 07:25 PM    RDW 12.7 10/24/2017 07:25 PM    PLATELET 867 36/87/9261 07:25 PM     Assessment/Plan:  A valproic acid level was drawn on 10/29/17 @ 0648 and found to be 68 ug/mL. This level was drawn approximately 9.5 hours after the evening dose on 10/28/17.  His previous level at BID dosing was higher than this level. Of note, LFTs are elevated from baseline labs. Recommend intensified monitoring or discontinuing medication.

## 2017-10-30 NOTE — BH NOTES
GROUP THERAPY PROGRESS NOTE    Radha Castro participated in the Acute Unit's afternoon Process Group, with a focus on identifying feelings, planning for the rest of the day, and preparing for discharge. Group time: 55 minutes. Personal goal for participation: To increase the capacity to improve ones mood and structure. Goal orientation: The patient will be able to identify their feelings, develop a plan for structuring their day, and discharge planning. Group therapy participation: With prompting, this patient participated in the group. Therapeutic interventions reviewed and discussed: The group members were asked to introduce themselves to each other and to see if they could identify an emotion they are having and/or let the group know what they want to focus on for the day as they continue to make discharge plans. Impression of participation: The patient said he was feeling \"weary. ..and skeptical\" about being on the unit. He was encouraged to speak and share more with the group if he could or let the group know if there were anything he might need from the members. He added only that he wanted to take things \"cautiously. \" He expressed no current SI/HI and displayed what may have been some paranoid thinking while in this group and/or he could have enormous trust issues. He also may have been responding to internal stimuli. His affect was anxious and depressed and his mood matched his affect. This was the patient's first process group with the undersigned.
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
## 2017-10-31 PROCEDURE — 65220000001 HC RM PRIVATE PSYCH

## 2017-10-31 PROCEDURE — 74011250637 HC RX REV CODE- 250/637: Performed by: PSYCHIATRY & NEUROLOGY

## 2017-10-31 RX ADMIN — OLANZAPINE 10 MG: 5 TABLET, ORALLY DISINTEGRATING ORAL at 21:05

## 2017-10-31 RX ADMIN — OLANZAPINE 10 MG: 5 TABLET, ORALLY DISINTEGRATING ORAL at 16:09

## 2017-10-31 RX ADMIN — DIPHENHYDRAMINE HYDROCHLORIDE 25 MG: 25 CAPSULE ORAL at 21:06

## 2017-10-31 RX ADMIN — OLANZAPINE 10 MG: 5 TABLET, ORALLY DISINTEGRATING ORAL at 08:07

## 2017-10-31 RX ADMIN — DIPHENHYDRAMINE HYDROCHLORIDE 25 MG: 25 CAPSULE ORAL at 08:07

## 2017-10-31 NOTE — BH NOTES
GROUP THERAPY PROGRESS NOTE    The patient Radha alfredo 30 y.o. male is participating in Exercise Group.     Group time: 30 minutes    Personal goal for participation: Daily goal setting    Goal orientation: personal    Group therapy participation: active      Therapeutic interventions reviewed and discussed:  Yes    Impression of participation:     Jennie Palomo  10/31/2017  10:36 AM

## 2017-10-31 NOTE — PROGRESS NOTES
Problem: Psychosis  Goal: *STG: Decreased delusional thinking  Outcome: Progressing Towards Goal  Pt. . Met with Dr. Eric Frank in treatment team       More talkative  And organized thinking    Discussed  Receiving another   Decanoate injection  Goal: *STG: Participates in individual and group therapy  Variance: Patient slowly responding  Select  Group participation  Goal: *STG/LTG: Complies with medication therapy  Outcome: Progressing Towards Goal  Medication compliant   Reviewed in treatment team  Goal: *STG/LTG: Demonstrates improved thought patterns as evidenced by logical and coherent speech  Outcome: Progressing Towards Goal  More organized  Coherent  thinking       Goal: Interventions  Outcome: Not Progressing Towards Goal  Will continue to monitor   On 15 min. Checks for safety   Assess  Thought process  Paranoia    Medication  Compliance   effectiveness   Encourage groups    Problem: Falls - Risk of  Goal: *Absence of Falls  Document Kedar Fall Risk and appropriate interventions in the flowsheet.    Fall Risk Interventions:  nonslip socks   Bed in low position   Mobility Interventions: Assess mobility with egress test    Mentation Interventions: Adequate sleep, hydration, pain control    Medication Interventions: Teach patient to arise slowly    Elimination Interventions: Call light in reach    History of Falls Interventions: Room close to nurse's station

## 2017-10-31 NOTE — PROGRESS NOTES
Problem: Psychosis  Goal: *STG: Remains safe in hospital  Outcome: Progressing Towards Goal  Patient is alert, calm and verbal.  Sitting quietly in his room. Greeted staff with a smile. No distress noted. Will continue to monitor.

## 2017-10-31 NOTE — BH NOTES
GROUP THERAPY PROGRESS NOTE    Mary Egan is participating in Reflections Group. Group time: 20 minutes    Personal goal for participation:     Goal orientation: community    Group therapy participation: active    Therapeutic interventions reviewed and discussed: Reviewed unit expectations and warning signs of symptoms    Impression of participation: Pt was appropriate and sustained attention throughout the discussion.

## 2017-10-31 NOTE — BH NOTES
GROUP THERAPY PROGRESS NOTE    Lizy Johnson did not participate in the Acute Unit's Process Group, with a focus identifying feelings, planning for the rest of the day, and discussing discharge.
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
## 2017-10-31 NOTE — PROGRESS NOTES
Problem: Falls - Risk of  Goal: *Absence of Falls  Document Kedar Fall Risk and appropriate interventions in the flowsheet. Outcome: Progressing Towards Goal  Fall Risk Interventions:  Patient has been fall free since arriving in the hospital, Acute Unit. He is currently sleeping, no stress noted.   Mobility Interventions: Assess mobility with egress test    Mentation Interventions: Adequate sleep, hydration, pain control    Medication Interventions: Teach patient to arise slowly    Elimination Interventions: Call light in reach    History of Falls Interventions: Room close to nurse's station

## 2017-10-31 NOTE — INTERDISCIPLINARY ROUNDS
Behavioral Health Interdisciplinary Rounds     Patient Name: Ayanna Martell  Age: 30 y.o. Room/Bed:  724/  Primary [facility]: [facility]   Admission Status: Involuntary Commitment and Forced Medication Order     Readmission within 30 days: no  Power of  in place: no  Patient requires a blocked bed: yes          Reason for blocked bed: Behaviors    VTE Prophylaxis: No  Flu vaccine given : no   Mobility needs/Fall risk: no    Nutritional Plan: no  Consults:          Labs/Testing due today?: no    Sleep hours:        Participation in Care/Groups:  yes  Medication Compliant?: Yes  PRNS (last 24 hours): None    Restraints (last 24 hours):  no  Substance Abuse:  no  CIWA (range last 24 hours):  COWS (range last 24 hours):   Alcohol screening (AUDIT) completed -  AUDIT Score: 0  If applicable, date SBIRT discussed in treatment team AND documented:   Tobacco - patient is a smoker: no   Date tobacco education completed by RN:   24 hour chart check complete: yes     Patient goal(s) for today:   Treatment team focus/goals: Plan to have his  visit and prepare for Prolixin Dec shot. Progress note - He has been pleasant and complaint with his medications. He contines to have no insight into his illness and the need for medications.       LOS:  19  Expected LOS: TBD     Financial concerns/prescription coverage:  no  Date of last family contact:       Family requesting physician contact today:  no  Discharge plan: he will return home with his family   Guns in the home:  no       [facility]     Participating treatment team members: Sally Eugene Dr., RN

## 2017-11-01 PROCEDURE — 65220000001 HC RM PRIVATE PSYCH

## 2017-11-01 PROCEDURE — 74011250637 HC RX REV CODE- 250/637: Performed by: PSYCHIATRY & NEUROLOGY

## 2017-11-01 PROCEDURE — 74011250636 HC RX REV CODE- 250/636: Performed by: PSYCHIATRY & NEUROLOGY

## 2017-11-01 RX ORDER — FLUPHENAZINE DECANOATE 25 MG/ML
25 INJECTION, SOLUTION INTRAMUSCULAR; SUBCUTANEOUS ONCE
Status: COMPLETED | OUTPATIENT
Start: 2017-11-01 | End: 2017-11-01

## 2017-11-01 RX ADMIN — IBUPROFEN 400 MG: 400 TABLET, FILM COATED ORAL at 23:59

## 2017-11-01 RX ADMIN — DIPHENHYDRAMINE HYDROCHLORIDE 25 MG: 25 CAPSULE ORAL at 07:52

## 2017-11-01 RX ADMIN — OLANZAPINE 10 MG: 5 TABLET, ORALLY DISINTEGRATING ORAL at 07:52

## 2017-11-01 RX ADMIN — FLUPHENAZINE DECANOATE 25 MG: 25 INJECTION, SOLUTION INTRAMUSCULAR; SUBCUTANEOUS at 21:40

## 2017-11-01 RX ADMIN — DIPHENHYDRAMINE HYDROCHLORIDE 25 MG: 25 CAPSULE ORAL at 21:14

## 2017-11-01 RX ADMIN — OLANZAPINE 10 MG: 5 TABLET, ORALLY DISINTEGRATING ORAL at 21:14

## 2017-11-01 RX ADMIN — ZOLPIDEM TARTRATE 10 MG: 10 TABLET ORAL at 23:59

## 2017-11-01 RX ADMIN — OLANZAPINE 10 MG: 5 TABLET, ORALLY DISINTEGRATING ORAL at 16:26

## 2017-11-01 NOTE — BH NOTES
GROUP THERAPY PROGRESS NOTE    Mary Egan participated in the Acute Unit's afternoon Process Group, with a focus on identifying feelings, planning for the rest of the day, and preparing for discharge. Group time: 60 minutes. Personal goal for participation: To increase the capacity to improve ones mood and structure. Goal orientation: The patient will be able to identify their feelings, develop a plan for structuring their day, and discharge planning. Group therapy participation: With prompting, this patient partially and passively participated in the group. Therapeutic interventions reviewed and discussed: The group members were asked to introduce themselves to each other and to see if they could identify an emotion they are having and/or let the group know what they want to focus on for the day as they continue to make discharge plans. Impression of participation: The patient came to group and passively seemed to be listening to the conversation before leaving about ten minutes before the end of the group. The only think he said as he left the group was that he was feeling \"tired. \" He looked slightly less depressed and anxious. His affect was largely constrained and his mood matched his affect. He still avoids full engagement with this therapy group. He expressed no current SI/HI and displayed no overt psychotic symptoms in this group. Given his lack of interaction, he may have been responding to internal stimuli or merely reluctant to talk about the events leading up to his hospitalization.  He did not claim anything that he might want to focus on while here in the hospital.

## 2017-11-01 NOTE — PROGRESS NOTES
Problem: Psychosis  Goal: *STG: Remains safe in hospital  Outcome: Progressing Towards Goal  Received pt resting in bed quietly. Respirations regular, even and unlabored. NAD. Q-15 checks for safety.     -24 hr chart review completed

## 2017-11-01 NOTE — PROGRESS NOTES
Adrianna Kirkland actively participated in [address] [place] [address] about [facility] on [place] [address] unit    [address] Sandhya Dawson M.Div M.S, Shari [address] available at [phone]

## 2017-11-01 NOTE — BH NOTES
GROUP THERAPY PROGRESS NOTE    Brittnee Dvaid is participating in Reflections. Group time: 15 minutes    Personal goal for participation: unit rules and daily progress    Goal orientation: personal    Group therapy participation: minimal    Therapeutic interventions reviewed and discussed: \"Warning Signs\" and word search handouts. Impression of participation: Quiet in group. Voiced no complaints. Seems in fair spirits.
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
## 2017-11-01 NOTE — PROGRESS NOTES
[facility]  Master Treatment Plan Jihan Ro        Date Treatment Plan Initiated: 11/1/17      Treatment Plan Modalities:    Type of Modality Amount  (x minutes) Frequency (x/week) Duration (x days) Name of Responsible Staff   Community & wrap-up meetings to encourage peer interactions    15    7    1     Librado Up., T   Group psychotherapy to assist in building coping skills and internal controls    60    7    1    Karson Lux LCSW   Therapeutic activity groups to build coping skills    60    7    1    Karson Lux LCSW   Psychoeducation in group setting to address:   Medication education    15    7    1    Shannan Opitz, RN   Coping skills    20    7    1    Shannan Opitz, RN   Relaxation techniques           Symptom management           Discharge planning    15    7    1    Radha Main,    Spirituality     60    7    1    dwayne RODRIGUEZ    60    7    1    Volunteer from [employer]   [facility]/AA/NA    60    7    1    Volunteer from [address] [place] [address] medication management    15    7    1    Dr. Melody Sellers meeting/discharge planning                                   Goals to be met by 11/8. Problem: Psychosis  Goal: *STG: Decreased hallucinations  Outcome: Progressing Towards Goal  Pt reports less auditory hallucinations  Goal: *STG: Decreased delusional thinking  Less delusional thinking noted, pt still has poor insight on his illness. Goal: *STG: Patient will verbalize areas in need of boundary recognition and limit setting  Pt responding well with limits, boundaries. Goal: *STG/LTG: Complies with medication therapy  Pt has poor insight on the need for long acting anti-psychotic injections. Poor insight into his mental illness and needs for recovery. Goal: *STG/LTG: Demonstrates improved thought patterns as evidenced by logical and coherent speech  Thoughts more organized.  Asking questions more, however unable to fully understand explanations for his need for meds. Problem: Falls - Risk of  Goal: *Absence of Falls  Document Kedar Fall Risk and appropriate interventions in the flowsheet.    Outcome: Progressing Towards Goal  Fall Risk Interventions:  Mobility Interventions: Assess mobility with egress test    Mentation Interventions: Adequate sleep, hydration, pain control    Medication Interventions: Teach patient to arise slowly    Elimination Interventions: Call light in reach    History of Falls Interventions: Room close to nurse's station

## 2017-11-01 NOTE — INTERDISCIPLINARY ROUNDS
Behavioral Health Interdisciplinary Rounds     Patient Name: Kendra Garcia  Age: 30 y.o. Room/Bed:  724/  Primary Diagnosis: Schizoaffective disorder (HCC)   Admission Status: Involuntary Commitment     Readmission within 30 days: no  Power of  in place: no  Patient requires a blocked bed:   Yes       Reason for blocked bed: Behavior  VTE Prophylaxis: No  Flu vaccine given : no   Mobility needs/Fall risk: no    Nutritional Plan: no  Consults:          Labs/Testing due today?: no    Sleep hours: 7 plus hrs       Participation in Care/Groups:  yes  Medication Compliant?: Yes  PRNS (last 24 hours): None    Restraints (last 24 hours):  no  Substance Abuse:  no  CIWA (range last 24 hours):  COWS (range last 24 hours):   Alcohol screening (AUDIT) completed -  AUDIT Score: 0  If applicable, date SBIRT discussed in treatment team AND documented:   Tobacco - patient is a smoker: no   Date tobacco education completed by RN: n/a  24 hour chart check complete: yes     Patient goal(s) for today:   Treatment team focus/goals: Plan to continue to adjust medications. He has agreed to take an increased dosage of Prolixin DEC. Plan to have his  visit. Progress note He has been doing well on the unit. He has been pacing on the unit.       LOS:  21  Expected LOS: TBD    Financial concerns/prescription coverage:    Date of last family contact:     Family requesting physician contact today:    Discharge plan: He will return home with his family  Guns in the home: no        Outpatient provider(s): [facility] team    Participating treatment team members: Miroslava Villa Dr., RN

## 2017-11-02 PROCEDURE — 65220000001 HC RM PRIVATE PSYCH

## 2017-11-02 PROCEDURE — 74011250637 HC RX REV CODE- 250/637: Performed by: PSYCHIATRY & NEUROLOGY

## 2017-11-02 RX ORDER — OLANZAPINE 10 MG/1
10 TABLET, ORALLY DISINTEGRATING ORAL 3 TIMES DAILY
Qty: 90 TAB | Refills: 9 | Status: SHIPPED | OUTPATIENT
Start: 2017-11-02

## 2017-11-02 RX ORDER — DIPHENHYDRAMINE HCL 25 MG
25 CAPSULE ORAL 2 TIMES DAILY
Qty: 60 CAP | Refills: 0 | Status: SHIPPED | OUTPATIENT
Start: 2017-11-02 | End: 2017-11-12

## 2017-11-02 RX ORDER — HYDROXYZINE 25 MG/1
25 TABLET, FILM COATED ORAL
Status: DISCONTINUED | OUTPATIENT
Start: 2017-11-02 | End: 2017-11-03 | Stop reason: HOSPADM

## 2017-11-02 RX ORDER — FLUPHENAZINE DECANOATE 25 MG/ML
75 INJECTION, SOLUTION INTRAMUSCULAR; SUBCUTANEOUS ONCE
Qty: 1 VIAL | Refills: 0 | Status: SHIPPED
Start: 2017-11-02 | End: 2017-11-02

## 2017-11-02 RX ADMIN — DIPHENHYDRAMINE HYDROCHLORIDE 25 MG: 25 CAPSULE ORAL at 21:11

## 2017-11-02 RX ADMIN — OLANZAPINE 10 MG: 5 TABLET, ORALLY DISINTEGRATING ORAL at 15:56

## 2017-11-02 RX ADMIN — OLANZAPINE 10 MG: 5 TABLET, ORALLY DISINTEGRATING ORAL at 21:11

## 2017-11-02 RX ADMIN — DIPHENHYDRAMINE HYDROCHLORIDE 25 MG: 25 CAPSULE ORAL at 08:25

## 2017-11-02 RX ADMIN — OLANZAPINE 10 MG: 5 TABLET, ORALLY DISINTEGRATING ORAL at 08:25
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
## 2017-11-02 NOTE — BH NOTES
GROUP THERAPY PROGRESS NOTE    Sudarshan Shukla is participating in Wellness Group: Using Music to Change  Our Mood(s)     Group time: 45 minutes    Personal goal for participation: Identifying songs that affect our moods for the better    Goal orientation: personal    Group therapy participation: active    Therapeutic interventions reviewed and discussed:     Impression of participation: Pt was very engaged in group topic and he said I like all kinds of music because to helps me feel better. Pt cited having a positive attitude and focus during the day.  Pt continues to improve in his thinking and level of group participation

## 2017-11-02 NOTE — PROGRESS NOTES
Problem: Psychosis  Goal: *STG: Decreased hallucinations  Outcome: Progressing Towards Goal  Patient denies ah/vh but has been observed responding to self in his room. Goal: *STG: Decreased delusional thinking  Outcome: Progressing Towards Goal  No delusional content verbalized but is guarded with verbal exchange. Goal: *STG: Remains safe in hospital  Outcome: Progressing Towards Goal  Pt denies any suicidal or homicidal thoughts. Contracts for safety. Remains on q 15 min safety checks. Goal: *STG: Accept constructive criticism without injury or isolation  Outcome: Progressing Towards Goal  Has been more accepting of redirection and improved boundary issues.
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
## 2017-11-02 NOTE — BH NOTES
GROUP THERAPY PROGRESS NOTE    Onelia Worley is participating in [facility]. Group time: 10 minutes    Personal goal for participation: Listen and hear what the other person is saying. Goal orientation: personal    Group therapy participation: active    Therapeutic interventions reviewed and discussed: Writer listened attentively and explained unit rules. Impression of participation: Patient participated actively.

## 2017-11-02 NOTE — PROGRESS NOTES
Problem: Psychosis  Goal: *STG: Remains safe in hospital  Outcome: Progressing Towards Goal  Visible on the unit. Mood is anxious. Pt denies SI. Compliant with meals and medications. Continue to encourage and educate. 1940: Parents are visiting.

## 2017-11-02 NOTE — DISCHARGE INSTRUCTIONS
DISCHARGE SUMMARY    NAME:Jermain Mariano  : 1980  MRN: [id number]    The patient Minh Dae exhibits the ability to control behavior in a less restrictive environment. Patient's level of functioning is improving. No assaultive/destructive behavior has been observed for the past 24 hours. No suicidal/homicidal threat or behavior has been observed for the past 24 hours. There is no evidence of serious medication side effects. Patient has not been in physical or protective restraints for at least the past 24 hours. If weapons involved, how are they secured? No weapons involved     Is patient aware of and in agreement with discharge plan? Patient is aware of discharge and is in agreement. Arrangements for medication:  Prescriptions given to patient. Copy of discharge instructions to  provider?:  Yes, fax transition record to [phone]    Arrangements for transportation home: Your  will  at 11:00     Keep all follow up appointments as scheduled, continue to take prescribed medications per physician instructions. Mental health crisis number:  [phone] or your local mental health crisis line number at [facility] 36 from Nurse    PATIENT INSTRUCTIONS:      What to do at Home:  Recommended activity: Activity as tolerated  If you experience any of the following symptoms thoughts of harming yourself or others, please follow up with  911 or your local mental health crisis line number at [phone]      *  Please give a list of your current medications to your Primary Care Provider. *  Please update this list whenever your medications are discontinued, doses are      changed, or new medications (including over-the-counter products) are added. *  Please carry medication information at all times in case of emergency situations.     These are general instructions for a healthy lifestyle:    No smoking/ No tobacco products/ Avoid exposure to second hand smoke  Surgeon General's Warning:  Quitting smoking now greatly reduces serious risk to your health. Obesity, smoking, and sedentary lifestyle greatly increases your risk for illness    A healthy diet, regular physical exercise & weight monitoring are important for maintaining a healthy lifestyle    You may be retaining fluid if you have a history of heart failure or if you experience any of the following symptoms:  Weight gain of 3 pounds or more overnight or 5 pounds in a week, increased swelling in our hands or feet or shortness of breath while lying flat in bed. Please call your doctor as soon as you notice any of these symptoms; do not wait until your next office visit. Recognize signs and symptoms of STROKE:    F-face looks uneven    A-arms unable to move or move unevenly    S-speech slurred or non-existent    T-time-call 911 as soon as signs and symptoms begin-DO NOT go       Back to bed or wait to see if you get better-TIME IS BRAIN. Warning Signs of HEART ATTACK     Call 911 if you have these symptoms:   Chest discomfort. Most heart attacks involve discomfort in the center of the chest that lasts more than a few minutes, or that goes away and comes back. It can feel like uncomfortable pressure, squeezing, fullness, or pain.  Discomfort in other areas of the upper body. Symptoms can include pain or discomfort in one or both arms, the back, neck, jaw, or stomach.  Shortness of breath with or without chest discomfort.  Other signs may include breaking out in a cold sweat, nausea, or lightheadedness. Don't wait more than five minutes to call 911 - MINUTES MATTER! Fast action can save your life. Calling 911 is almost always the fastest way to get lifesaving treatment. Emergency Medical Services staff can begin treatment when they arrive -- up to an hour sooner than if someone gets to the hospital by car. The discharge information has been reviewed with the patient.   The patient verbalized understanding. Discharge medications reviewed with the patient and appropriate educational materials and side effects teaching were provided.   ___________________________________________________________________________________________________________________________________

## 2017-11-02 NOTE — PROGRESS NOTES
Problem: Falls - Risk of  Goal: *Absence of Falls  Document Kedar Fall Risk and appropriate interventions in the flowsheet. Outcome: Progressing Towards Goal  Patient resting in bed comfortaly. Respirations regular, even and unlabored. NAD.  Q-15 checks for safety    -24 hr chart review completed    Fall Risk Interventions:  Mobility Interventions: Assess mobility with egress test    Mentation Interventions: Adequate sleep, hydration, pain control    Medication Interventions: Teach patient to arise slowly    Elimination Interventions: Call light in reach    History of Falls Interventions: Room close to nurse's station

## 2017-11-02 NOTE — BH NOTES
PRN Medication Documentation    Specific patient behavior that led to need for PRN medication: restless   Staff interventions attempted prior to PRN being given: snack provided   PRN medication given: Ambien 10 mg po   Patient response/effectiveness of PRN medication: pending   PRN Medication Documentation    Specific patient behavior that led to need for PRN medication: back pain 5/10 , difficulty getting comfortable to sleep    Staff interventions attempted prior to PRN being given: Sleeping aid given   PRN medication given: motrin 400 mg po   Patient response/effectiveness of PRN medication: pending

## 2017-11-02 NOTE — INTERDISCIPLINARY ROUNDS
Behavioral Health Interdisciplinary Rounds     Patient Name: Kit Buckley  Age: 30 y.o. Room/Bed:  724/  Primary Diagnosis: Schizoaffective disorder (HCC)   Admission Status: Involuntary Commitment     Readmission within 30 days: no  Power of  in place: no  Patient requires a blocked bed: yes          Reason for blocked bed: Behavior    VTE Prophylaxis: Not indicated  Flu vaccine given : no   Mobility needs/Fall risk: no    Nutritional Plan: no  Consults:          Labs/Testing due today?: no    Sleep hours: 5.5 hrs       Participation in Care/Groups:  yes  Medication Compliant?: Yes  PRNS (last 24 hours): Sleep Aid and Pain    Restraints (last 24 hours):  no  Substance Abuse:  no  CIWA (range last 24 hours):  COWS (range last 24 hours):   Alcohol screening (AUDIT) completed -  AUDIT Score: 0  If applicable, date SBIRT discussed in treatment team AND documented:   Tobacco - patient is a smoker: no   Date tobacco education completed by RN:   24 hour chart check complete: yes     Patient goal(s) for today:   Treatment team focus/goals: Plan for discharge on Friday. Progress note He has been doing well on the unit. He is compliant with his medication.      LOS:  22  Expected LOS: plan for discharge on Friday    Financial concerns/prescription coverage:    Date of last family contact:      Family requesting physician contact today:   Discharge plan: He will return home with his family  Guns in the home: no        Outpatient provider(s): [facility] team    Participating treatment team members: Kit BuckleyJimena Dr., RN

## 2017-11-03 VITALS
SYSTOLIC BLOOD PRESSURE: 131 MMHG | RESPIRATION RATE: 18 BRPM | WEIGHT: 212.25 LBS | OXYGEN SATURATION: 99 % | TEMPERATURE: 97.5 F | HEIGHT: 72 IN | DIASTOLIC BLOOD PRESSURE: 82 MMHG | BODY MASS INDEX: 28.75 KG/M2 | HEART RATE: 92 BPM

## 2017-11-03 PROCEDURE — 74011250637 HC RX REV CODE- 250/637: Performed by: PSYCHIATRY & NEUROLOGY

## 2017-11-03 RX ADMIN — OLANZAPINE 10 MG: 5 TABLET, ORALLY DISINTEGRATING ORAL at 08:11

## 2017-11-03 RX ADMIN — DIPHENHYDRAMINE HYDROCHLORIDE 25 MG: 25 CAPSULE ORAL at 08:11

## 2017-11-03 NOTE — PROGRESS NOTES
Problem: Psychosis  Goal: *STG: Remains safe in hospital  Outcome: Progressing Towards Goal  Received patient resting in bed quietly. Respirations regular, even and unlabored. NAD. Q-15 checks for safety.     24 hr chart review completed

## 2017-11-03 NOTE — BH NOTES
Pt discharging home today. Discharge instructions reviewed with pt. Expected time of discharge around 11 am. Prescriptions provided. Pt belongings to be returned at time of discharge. No valuables in security. Denies SI/HI. Verbalized understanding of discharge instructions and meds. Pt's  will pick him up. Remains on q 15 min safety checks. 200- Pt discharged with his . Prescriptions and discharge paper work provided. All belongings returned. All questions answered.

## 2017-11-03 NOTE — INTERDISCIPLINARY ROUNDS
Behavioral Health Interdisciplinary Rounds     Patient Name: Aissatou Levine  Age: 30 y.o.   Room/Bed:  724/  Primary Diagnosis: Schizoaffective disorder (HCC)   Admission Status: Involuntary Commitment     Readmission within 30 days: no  Power of  in place: no  Patient requires a blocked bed: yes          Reason for blocked bed: Behavior    VTE Prophylaxis: Not indicated  Flu vaccine given : no   Mobility needs/Fall risk: no    Nutritional Plan: no  Consults:          Labs/Testing due today?: no    Sleep hours: 7 hrs       Participation in Care/Groups:  yes  Medication Compliant?: Yes  PRNS (last 24 hours): None    Restraints (last 24 hours):  no  Substance Abuse:  no  CIWA (range last 24 hours):  COWS (range last 24 hours):   Alcohol screening (AUDIT) completed -  AUDIT Score: 0  If applicable, date SBIRT discussed in treatment team AND documented:   Tobacco - patient is a smoker: no   Date tobacco education completed by RN: n/a  24 hour chart check complete: yes     Patient goal(s) for today:   Treatment team focus/goals: discharge today   Progress note - discharge today     LOS:  23  Expected LOS: TBD    Financial concerns/prescription coverage:    Date of last family contact:      Family requesting physician contact today:   Discharge plan: He will return home with his family  Guns in the home: no        Outpatient provider(s): [facility] team    Participating treatment team members: Shellie Partida - Dr. Robi Santoyo  Thiago Emperoeric

## 2017-11-03 NOTE — BH NOTES
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 injection Comments:   Reason for Stopping:               Unresulted Labs     None        To obtain results of studies pending at discharge, please contact [phone]     Follow-up Information     Follow up With Details Comments [address] [place] [address] On 11/3/2017 your  will  and transport home at 11:00  [address] [place] [address] On 11/8/2017 you have a 10:30 appointment with Dr. Сергей Zarate [address]  [phone]    Pt. received fluphenazine decanoate IM injection - 50mg on 10/27 and 25mg on 11/1  Recommend maintanence dose of fluphenazine 75mg      Edgar England NP   [address]  [address] [address]  [phone]            Advanced Directive:   Does the patient have an appointed surrogate decision maker? No  Does the patient have a Medical Advance Directive? No  Does the patient have a Psychiatric Advance Directive?  No  If the patient does not have a surrogate or Medical Advance Directive AND Psychiatric Advance Directive, the patient was offered information on these advance directives Patient declined to complete      Patient Instructions: Please continue all medications until otherwise directed by physician. Tobacco Cessation Discharge Plan:   Is the patient a smoker and needs referral for smoking cessation? No  Patient referred to the following for smoking cessation with an appointment? Not applicable     Patient was offered medication to assist with smoking cessation at discharge? Not applicable  Was education for smoking cessation added to the discharge instructions? Not applicable    Alcohol/Substance Abuse Discharge Plan:   Does the patient have a history of substance/alcohol abuse and requires a referral for treatment? No  Patient referred to the following for substance/alcohol abuse treatment with an appointment? Not applicable  Patient was offered medication to assist with alcohol cessation at discharge? Not applicable  Was education for substance/alcohol abuse added to discharge instructions? Not applicable    Patient discharged to Home; discussed with patient/caregiver, provided to the patient/caregiver either in hard copy or electronically. and patient refused hard copy.

## 2017-12-08 ENCOUNTER — OFFICE VISIT (OUTPATIENT)
Dept: FAMILY MEDICINE CLINIC | Age: 37
End: 2017-12-08

## 2017-12-08 VITALS
DIASTOLIC BLOOD PRESSURE: 74 MMHG | WEIGHT: 219 LBS | TEMPERATURE: 98.5 F | HEIGHT: 72 IN | HEART RATE: 64 BPM | OXYGEN SATURATION: 99 % | RESPIRATION RATE: 16 BRPM | SYSTOLIC BLOOD PRESSURE: 112 MMHG | BODY MASS INDEX: 29.66 KG/M2

## 2017-12-08 DIAGNOSIS — J30.9 ACUTE ALLERGIC RHINITIS, UNSPECIFIED SEASONALITY, UNSPECIFIED TRIGGER: ICD-10-CM

## 2017-12-08 DIAGNOSIS — F25.9 SCHIZOAFFECTIVE DISORDER, UNSPECIFIED TYPE (HCC): ICD-10-CM

## 2017-12-08 DIAGNOSIS — L29.9 ITCHING: ICD-10-CM

## 2017-12-08 DIAGNOSIS — R94.6 ABNORMAL RESULTS OF THYROID FUNCTION STUDIES: ICD-10-CM

## 2017-12-08 DIAGNOSIS — M62.838 CERVICAL PARASPINAL MUSCLE SPASM: ICD-10-CM

## 2017-12-08 DIAGNOSIS — R35.0 URINARY FREQUENCY: ICD-10-CM

## 2017-12-08 DIAGNOSIS — E78.00 PURE HYPERCHOLESTEROLEMIA: Primary | ICD-10-CM

## 2017-12-08 LAB
BILIRUB UR QL STRIP: NEGATIVE
GLUCOSE UR-MCNC: NEGATIVE MG/DL
KETONES P FAST UR STRIP-MCNC: NEGATIVE MG/DL
PH UR STRIP: 5 [PH] (ref 4.6–8)
PROT UR QL STRIP: NEGATIVE
SP GR UR STRIP: 1.02 (ref 1–1.03)
UA UROBILINOGEN AMB POC: NORMAL (ref 0.2–1)
URINALYSIS CLARITY POC: CLEAR
URINALYSIS COLOR POC: YELLOW
URINE BLOOD POC: NORMAL
URINE LEUKOCYTES POC: NEGATIVE
URINE NITRITES POC: NEGATIVE

## 2017-12-08 RX ORDER — LORATADINE 10 MG/1
10 TABLET ORAL DAILY
Qty: 30 TAB | Refills: 2 | Status: SHIPPED | OUTPATIENT
Start: 2017-12-08 | End: 2018-03-04 | Stop reason: SDUPTHER

## 2017-12-08 RX ORDER — BACLOFEN 10 MG/1
10 TABLET ORAL
Qty: 30 TAB | Refills: 1 | Status: SHIPPED | OUTPATIENT
Start: 2017-12-08

## 2017-12-08 RX ORDER — FLUPHENAZINE DECANOATE 25 MG/ML
INJECTION, SOLUTION INTRAMUSCULAR; SUBCUTANEOUS
Refills: 2 | COMMUNITY
Start: 2017-11-21

## 2017-12-08 RX ORDER — OLANZAPINE 15 MG/1
TABLET, ORALLY DISINTEGRATING ORAL
Refills: 2 | COMMUNITY
Start: 2017-11-17

## 2017-12-08 NOTE — MR AVS SNAPSHOT
Visit Information Date & Time Provider Department Dept. Phone Encounter #  
 12/8/2017 11:00 AM Annette Walls, NP [address] [facility] [phone] [id number] Follow-up Instructions Return if symptoms worsen or fail to improve. Upcoming Health Maintenance Date Due DTaP/Tdap/Td series (2 - Td) 12/8/2027 Allergies as of 12/8/2017  Review Complete On: 12/8/2017 By: Deangelo Stringer LPN Severity Noted Reaction Type Reactions Bee Sting [Sting, Bee]  12/05/2016    Swelling Haldol [Haloperidol Lactate]  12/05/2016    Other (comments) Muscle spasms Pcn [Penicillins]  12/05/2016    Swelling Shellfish Derived  12/05/2016    Other (comments) Tingling in mouth Current Immunizations  Never Reviewed No immunizations on file. Not reviewed this visit You Were Diagnosed With   
  
 Codes Comments Pure hypercholesterolemia    -  Primary ICD-10-CM: E78.00 ICD-9-CM: 272.0 Abnormal results of thyroid function studies     ICD-10-CM: R94.6 ICD-9-CM: 794.5 Itching     ICD-10-CM: L29.9 ICD-9-CM: 698.9 Acute allergic rhinitis, unspecified seasonality, unspecified trigger     ICD-10-CM: J30.9 ICD-9-CM: 477.9 Cervical paraspinal muscle spasm     ICD-10-CM: L92.601 ICD-9-CM: 728.85 Urinary frequency     ICD-10-CM: R35.0 ICD-9-CM: 788.41 Schizoaffective disorder, unspecified type ([facility]     ICD-10-CM: F25.9 ICD-9-CM: 295.70 Vitals BP Pulse Temp Resp Height(growth percentile) Weight(growth percentile) 112/74 64 98.5 °F (36.9 °C) (Oral) 16 6' (1.829 m) 219 lb (99.3 kg) SpO2 BMI Smoking Status 99% 29.7 kg/m2 Current Every Day Smoker Vitals History BMI and BSA Data Body Mass Index Body Surface Area  
 29.7 kg/m 2 2.25 m 2 Preferred Pharmacy Pharmacy Name Phone [facility]/PH[facility]ACY #[id number] [facility] [phone] Your Updated Medication List  
  
   
This list is accurate as of: 12/8/17 11:39 AM.  Always use your most recent med list.  
  
  
  
  
 baclofen 10 mg tablet Commonly known as:  LIORESAL Take 1 Tab by mouth three (3) times daily as needed. For muscle spasms  
  
 fluPHENAZine decanoate 25 mg/mL injection Commonly known as:  PROLIXIN INJECT 2 ML INTRAMUSCULARLY ONCE EVERY TWO WEEKS  
  
 loratadine 10 mg tablet Commonly known as:  Moises Alejandro Take 1 Tab by mouth daily. * OLANZapine 10 mg disintegrating tablet Commonly known as:  ZyPREXA zydis Take 1 Tab by mouth three (3) times daily. Indications: Schizophrenia * OLANZapine 15 mg disintegrating tablet Commonly known as:  ZyPREXA zydis DISSOLVE 1 TABLET UNDER TONGUE TWICE A DAY * Notice: This list has 2 medication(s) that are the same as other medications prescribed for you. Read the directions carefully, and ask your doctor or other care provider to review them with you. Prescriptions Sent to Pharmacy Refills  
 loratadine (CLARITIN) 10 mg tablet 2 Sig: Take 1 Tab by mouth daily. Class: Normal  
 Pharmacy: [facility]/pharmacy #[id number] Ph #: [phone] Route: Oral  
 baclofen (LIORESAL) 10 mg tablet 1 Sig: Take 1 Tab by mouth three (3) times daily as needed. For muscle spasms Class: Normal  
 Pharmacy: [facility]/pharmacy #[id number] [place] [address] Ph #: [phone] Route: Oral  
  
We Performed the Following CBC WITH AUTOMATED DIFF [08404 CPT(R)] LIPID PANEL [69628 CPT(R)] METABOLIC PANEL, COMPREHENSIVE [66420 CPT(R)] TSH 3RD GENERATION [15664 CPT(R)] Follow-up Instructions Return if symptoms worsen or fail to improve. Patient Instructions Neck Spasm: Exercises Your Care Instructions Here are some examples of typical rehabilitation exercises for your condition. Start each exercise slowly. Ease off the exercise if you start to have pain. Your doctor or physical therapist will tell you when you can start these exercises and which ones will work best for you. How to do the exercises Levator scapula stretch 1. Sit in a firm chair, or stand up straight. 2. Gently tilt your head toward your left shoulder. 3. Turn your head to look down into your armpit, bending your head slightly forward. Let the weight of your head stretch your neck muscles. 4. Hold for 15 to 30 seconds. 5. Return to your starting position. 6. Follow the same instructions above, but tilt your head toward your right shoulder. 7. Repeat 2 to 4 times toward each shoulder. Upper trapezius stretch 1. Sit in a firm chair, or stand up straight. 2. This stretch works best if you keep your shoulder down as you lean away from it. To help you remember to do this, start by relaxing your shoulders and lightly holding on to your thighs or your chair. 3. Tilt your head toward your shoulder and hold for 15 to 30 seconds. Let the weight of your head stretch your muscles. 4. If you would like a little added stretch, place your arm behind your back. Use the arm opposite of the direction you are tilting your head. For example, if you are tilting your head to the left, place your right arm behind your back. 5. Repeat 2 to 4 times toward each shoulder. Neck rotation 1. Sit in a firm chair, or stand up straight. 2. Keeping your chin level, turn your head to the right, and hold for 15 to 30 seconds. 3. Turn your head to the left, and hold for 15 to 30 seconds. 4. Repeat 2 to 4 times to each side. Chin tuck 1. Lie on the floor with a rolled-up towel under your neck. Your head should be touching the floor. 2. Slowly bring your chin toward the front of your neck. 3. Hold for a count of 6, and then relax for up to 10 seconds. 4. Repeat 8 to 12 times. Forward neck flexion 1. Sit in a firm chair, or stand up straight. 2. Bend your head forward. 3. Hold for 15 to 30 seconds, then return to your starting position. 4. Repeat 2 to 4 times. Follow-up care is a key part of your treatment and safety. Be sure to make and go to all appointments, and call your doctor if you are having problems. It's also a good idea to know your test results and keep a list of the medicines you take. Where can you learn more? Go to http://sharon-kash.info/. Enter P962 in the search box to learn more about \"Neck Spasm: Exercises. \" Current as of: March 21, 2017 Content Version: 11.4 © 7081-7455 [employer]. Care instructions adapted under license by [employer] (which disclaims liability or warranty for this information). If you have questions about a medical condition or this instruction, always ask your healthcare professional. [address] any warranty or liability for your use of this information. Introducing hospitals & HEALTH SERVICES! Misha Vela introduces [employer] patient portal. Now you can access parts of your medical record, email your doctor's office, and request medication refills online. 1. In your internet browser, go to [email or web address] 2. Click on the First Time User? Click Here link in the Sign In box. You will see the New Member Sign Up page. 3. Enter your [employer] Access Code exactly as it appears below. You will not need to use this code after youve completed the sign-up process. If you do not sign up before the expiration date, you must request a new code. · [employer] Access Code: [email or web address] Expires: 2/1/2018  7:24 AM 
 
4. Enter the last four digits of your Social Security Number (xxxx) and Date of Birth (mm/dd/yyyy) as indicated and click Submit. You will be taken to the next sign-up page. 5. Create a [employer] ID.  This will be your [employer] login ID and cannot be changed, so think of one that is secure and easy to remember. 6. Create a [employer] password. You can change your password at any time. 7. Enter your Password Reset Question and Answer. This can be used at a later time if you forget your password. 8. Enter your e-mail address. You will receive e-mail notification when new information is available in [address] 9. Click Sign Up. You can now view and download portions of your medical record. 10. Click the Download Summary menu link to download a portable copy of your medical information. If you have questions, please visit the Frequently Asked Questions section of the [employer] website. Remember, [employer] is NOT to be used for urgent needs. For medical emergencies, dial 911. Now available from your iPhone and Android! Please provide this summary of care documentation to your next provider. Your primary care clinician is listed as Dharmesh Chris. If you have any questions after today's visit, please call [phone].

## 2017-12-08 NOTE — PATIENT INSTRUCTIONS
Neck Spasm: Exercises  Your Care Instructions  Here are some examples of typical rehabilitation exercises for your condition. Start each exercise slowly. Ease off the exercise if you start to have pain. Your doctor or physical therapist will tell you when you can start these exercises and which ones will work best for you. How to do the exercises  Levator scapula stretch    1. Sit in a firm chair, or stand up straight. 2. Gently tilt your head toward your left shoulder. 3. Turn your head to look down into your armpit, bending your head slightly forward. Let the weight of your head stretch your neck muscles. 4. Hold for 15 to 30 seconds. 5. Return to your starting position. 6. Follow the same instructions above, but tilt your head toward your right shoulder. 7. Repeat 2 to 4 times toward each shoulder. Upper trapezius stretch    1. Sit in a firm chair, or stand up straight. 2. This stretch works best if you keep your shoulder down as you lean away from it. To help you remember to do this, start by relaxing your shoulders and lightly holding on to your thighs or your chair. 3. Tilt your head toward your shoulder and hold for 15 to 30 seconds. Let the weight of your head stretch your muscles. 4. If you would like a little added stretch, place your arm behind your back. Use the arm opposite of the direction you are tilting your head. For example, if you are tilting your head to the left, place your right arm behind your back. 5. Repeat 2 to 4 times toward each shoulder. Neck rotation    1. Sit in a firm chair, or stand up straight. 2. Keeping your chin level, turn your head to the right, and hold for 15 to 30 seconds. 3. Turn your head to the left, and hold for 15 to 30 seconds. 4. Repeat 2 to 4 times to each side. Chin tuck    1. Lie on the floor with a rolled-up towel under your neck. Your head should be touching the floor. 2. Slowly bring your chin toward the front of your neck.   3. Hold for a count of 6, and then relax for up to 10 seconds. 4. Repeat 8 to 12 times. Forward neck flexion    1. Sit in a firm chair, or stand up straight. 2. Bend your head forward. 3. Hold for 15 to 30 seconds, then return to your starting position. 4. Repeat 2 to 4 times. Follow-up care is a key part of your treatment and safety. Be sure to make and go to all appointments, and call your doctor if you are having problems. It's also a good idea to know your test results and keep a list of the medicines you take. Where can you learn more? Go to http://sharon-kash.info/. Enter P962 in the search box to learn more about \"Neck Spasm: Exercises. \"  Current as of: March 21, 2017  Content Version: 11.4  © 3526-4981 [employer]. Care instructions adapted under license by [employer] (which disclaims liability or warranty for this information). If you have questions about a medical condition or this instruction, always ask your healthcare professional. [place] [address] any warranty or liability for your use of this information.

## 2017-12-08 NOTE — PROGRESS NOTES
1. Have you been to the ER, urgent care clinic since your last visit? Hospitalized since your last visit? No    2. Have you seen or consulted any other health care providers outside of the [address] [place] [address] since your last visit? Include any pap smears or colon screening.  No  Chief Complaint   Patient presents with    Labs     Fasting    Complete Physical

## 2017-12-08 NOTE — PROGRESS NOTES
Kit Buckley is a 30 y.o. male presenting for their annual checkup. Follows a low fat diet?  no.  Dietary recall:  3 meals a day, some fruits and vegetables, drinks mostly water or sometimes soda   Follow an exercise program?  no  Hours of sleep?  8 hrs but sometimes has problems sleeping   Changes in bowel or bladder habits?  no  Up to date on Tdap (<10 years)? No, needs today  Feels stable and well emotionally? Yes     Current concerns include: Pt complaining of itching - states improves with Benadryl. Pt states having some neck and arm pain, unsure what is causing. Does not work     Past Medical History:   Diagnosis Date    Asthma     Depression     Schizoaffective disorder ([facility]       Past Surgical History:   Procedure Laterality Date    HX APPENDECTOMY      HX WISDOM TEETH EXTRACTION        Prior to Admission medications    Medication Sig Start Date End Date Taking? Authorizing Provider   OLANZapine (ZYPREXA ZYDIS) 15 mg disintegrating tablet DISSOLVE 1 TABLET UNDER TONGUE TWICE A DAY 11/17/17  Yes Historical Provider   fluPHENAZine decanoate (PROLIXIN) 25 mg/mL injection INJECT 2 ML INTRAMUSCULARLY ONCE EVERY TWO WEEKS 11/21/17  Yes Historical Provider   loratadine (CLARITIN) 10 mg tablet Take 1 Tab by mouth daily. 12/8/17  Yes Lisandro Bocanegra NP   baclofen (LIORESAL) 10 mg tablet Take 1 Tab by mouth three (3) times daily as needed. For muscle spasms 12/8/17  Yes Lisandro Bocanegra NP   OLANZapine (ZYPREXA ZYDIS) 10 mg disintegrating tablet Take 1 Tab by mouth three (3) times daily.  Indications: Schizophrenia 11/2/17   Naldo Rucker MD     Allergies   Allergen Reactions    Bee Sting [Sting, Bee] Swelling    Haldol [Haloperidol Lactate] Other (comments)     Muscle spasms    Pcn [Penicillins] Swelling    Shellfish Derived Other (comments)     Tingling in mouth      Social History   Substance Use Topics    Smoking status: Current Every Day Smoker     Packs/day: 0.50     Years: 15.00    Smokeless tobacco: Never Used      Comment: no response    Alcohol use 0.6 oz/week     1 Cans of beer per week      Family History   Problem Relation Age of Onset    Heart Disease Father     Hypertension Father     Stroke Father     Kidney Disease Father       Review of Systems -   Psychological ROS: negative  Ophthalmic ROS: negative  ENT ROS: negative  Endocrine ROS: negative  Breast ROS: negative  Respiratory ROS: no cough, shortness of breath, or wheezing  Cardiovascular ROS: no chest pain or dyspnea on exertion  Gastrointestinal ROS: no abdominal pain, change in bowel habits, or black or bloody stools  Genito-Urinary ROS: no dysuria, trouble voiding, or hematuria  Musculoskeletal ROS: negative  Neurological ROS: no TIA or stroke symptoms  Dermatological ROS: negative    Objective:  Visit Vitals    /74    Pulse 64    Temp 98.5 °F (36.9 °C) (Oral)    Resp 16    Ht 6' (1.829 m)    Wt 219 lb (99.3 kg)    SpO2 99%    BMI 29.7 kg/m2     The physical exam is generally normal. He appears well, alert and oriented x 3, pleasant and cooperative. Vitals as noted. ENT normal, neck supple and free of adenopathy, or masses. No thyromegaly or carotid bruits. Cranial nerves and fundi normal. Lungs are clear to auscultation. Heart sounds are normal, no murmurs, clicks, gallops or rubs. Abdomen is soft, no tenderness, masses or organomegaly. Extremities, peripheral pulses and reflexes are normal. Screening neurological exam is normal without focal findings. Skin is normal without suspicious lesions. Assessment/Plan:  Diagnoses and all orders for this visit:    1. Pure hypercholesterolemia  -     METABOLIC PANEL, COMPREHENSIVE  -     LIPID PANEL  Will decide on F/U after reviewing labs. 2. Abnormal results of thyroid function studies  -     TSH 3RD GENERATION    3. Itching  -     CBC WITH AUTOMATED DIFF  -     METABOLIC PANEL, COMPREHENSIVE  -     loratadine (CLARITIN) 10 mg tablet;  Take 1 Tab by mouth daily.  New rx.     4. Acute allergic rhinitis, unspecified seasonality, unspecified trigger  -     loratadine (CLARITIN) 10 mg tablet; Take 1 Tab by mouth daily. 5. Cervical paraspinal muscle spasm  -     baclofen (LIORESAL) 10 mg tablet; Take 1 Tab by mouth three (3) times daily as needed. For muscle spasms  New rx. Activity modification, application of heat or ice. Exercises given. 6. Urinary frequency  -     AMB POC URINALYSIS DIP STICK AUTO W/O MICRO  No signs of infection on dipstick. 7. Schizoaffective disorder, unspecified type ([facility]  -     METABOLIC PANEL, COMPREHENSIVE    Discussed importance of healthy diet and regular exercise, recommended multivitamin and sunscreen usage. Encouraged monthly self breast/testicular exam.      Follow-up Disposition:  Return if symptoms worsen or fail to improve.     Elin Sky, AME

## 2017-12-08 NOTE — LETTER
12/13/2017 11:57 AM 
 
Mr. Cristian Gregorio [address] [address] Dear Cristian Gregorio: 
 
Please find your most recent results below. Resulted Orders CBC WITH AUTOMATED DIFF Result Value Ref Range WBC 6.6 3.4 - 10.8 x10E3/uL  
 RBC 5.23 4. 14 - 5.80 x10E6/uL HGB 15.7 13.0 - 17.7 g/dL Comment: **Please note reference interval change** HCT 46.4 37.5 - 51.0 % MCV 89 79 - 97 fL  
 MCH 30.0 26.6 - 33.0 pg  
 MCHC 33.8 31.5 - 35.7 g/dL  
 RDW 13.8 12.3 - 15.4 % PLATELET 596 908 - 888 x10E3/uL NEUTROPHILS 59 Not Estab. % Lymphocytes 28 Not Estab. % MONOCYTES 7 Not Estab. % EOSINOPHILS 4 Not Estab. % BASOPHILS 1 Not Estab. %  
 ABS. NEUTROPHILS 3.9 1.4 - 7.0 x10E3/uL Abs Lymphocytes 1.9 0.7 - 3.1 x10E3/uL  
 ABS. MONOCYTES 0.4 0.1 - 0.9 x10E3/uL  
 ABS. EOSINOPHILS 0.3 0.0 - 0.4 x10E3/uL  
 ABS. BASOPHILS 0.0 0.0 - 0.2 x10E3/uL IMMATURE GRANULOCYTES 1 Not Estab. %  
 ABS. IMM. GRANS. 0.1 0.0 - 0.1 x10E3/uL Narrative Performed at:  [address]  [id number] : Alvin Garcia MD, Phone:  [phone] TSH 3RD GENERATION Result Value Ref Range TSH 3.290 0.450 - 4.500 uIU/mL Narrative Performed at:  [address]  [id number] : Alvin Garcia MD, Phone:  [phone] METABOLIC PANEL, COMPREHENSIVE Result Value Ref Range Glucose 88 65 - 99 mg/dL BUN 11 6 - 20 mg/dL Creatinine 0.93 0.76 - 1.27 mg/dL GFR est non- >59 mL/min/1.73 GFR est  >59 mL/min/1.73  
 BUN/Creatinine ratio 12 9 - 20 Sodium 139 134 - 144 mmol/L Potassium 4.4 3.5 - 5.2 mmol/L Chloride 103 96 - 106 mmol/L  
 CO2 23 18 - 29 mmol/L Calcium 9.4 8.7 - 10.2 mg/dL Protein, total 6.9 6.0 - 8.5 g/dL Albumin 4.3 3.5 - 5.5 g/dL GLOBULIN, TOTAL 2.6 1.5 - 4.5 g/dL A-G Ratio 1.7 1.2 - 2.2 Bilirubin, total 0.2 0.0 - 1.2 mg/dL Alk. phosphatase 78 39 - 117 IU/L  
 AST (SGOT) 25 0 - 40 IU/L  
 ALT (SGPT) 22 0 - 44 IU/L Narrative Performed at:  [address]  [id number] : Trevor Alcantara MD, Phone:  [phone] LIPID PANEL Result Value Ref Range Cholesterol, total 222 (H) 100 - 199 mg/dL Triglyceride 200 (H) 0 - 149 mg/dL HDL Cholesterol 38 (L) >39 mg/dL VLDL, calculated 40 5 - 40 mg/dL LDL, calculated 144 (H) 0 - 99 mg/dL Narrative Performed at:  [address]  [id number] : Trevor Alcantara MD, Phone:  [phone] AMB POC URINALYSIS DIP STICK AUTO W/O MICRO Result Value Ref Range Color (UA POC) Yellow Clarity (UA POC) Clear Glucose (UA POC) Negative Negative Bilirubin (UA POC) Negative Negative Ketones (UA POC) Negative Negative Specific gravity (UA POC) 1.020 1.001 - 1.035 Blood (UA POC) Trace Negative pH (UA POC) 5.0 4.6 - 8.0 Protein (UA POC) Negative Negative Urobilinogen (UA POC) 0.2 mg/dL 0.2 - 1 Nitrites (UA POC) Negative Negative Leukocyte esterase (UA POC) Negative Negative CVD REPORT Result Value Ref Range INTERPRETATION Note Comment:  
   Supplemental report is available. Narrative Performed at:  [address] [place] [address]  [id number] : Min Joya PhD, Phone:  [phone] RECOMMENDATIONS: 
Please inform pt all labs normal side from cholesterol, was pt fully fasting? Labs done in hospital last month are much better than ones we lalitha suggesting pt had eaten something prior to test.  If not, work on diet and exercise and recheck 3 mo. Please call me if you have any questions: [phone] Sincerely, Katelyn Reeves NP

## 2017-12-09 LAB
ALBUMIN SERPL-MCNC: 4.3 G/DL (ref 3.5–5.5)
ALBUMIN/GLOB SERPL: 1.7 {RATIO} (ref 1.2–2.2)
ALP SERPL-CCNC: 78 IU/L (ref 39–117)
ALT SERPL-CCNC: 22 IU/L (ref 0–44)
AST SERPL-CCNC: 25 IU/L (ref 0–40)
BASOPHILS # BLD AUTO: 0 X10E3/UL (ref 0–0.2)
BASOPHILS NFR BLD AUTO: 1 %
BILIRUB SERPL-MCNC: 0.2 MG/DL (ref 0–1.2)
BUN SERPL-MCNC: 11 MG/DL (ref 6–20)
BUN/CREAT SERPL: 12 (ref 9–20)
CALCIUM SERPL-MCNC: 9.4 MG/DL (ref 8.7–10.2)
CHLORIDE SERPL-SCNC: 103 MMOL/L (ref 96–106)
CHOLEST SERPL-MCNC: 222 MG/DL (ref 100–199)
CO2 SERPL-SCNC: 23 MMOL/L (ref 18–29)
CREAT SERPL-MCNC: 0.93 MG/DL (ref 0.76–1.27)
EOSINOPHIL # BLD AUTO: 0.3 X10E3/UL (ref 0–0.4)
EOSINOPHIL NFR BLD AUTO: 4 %
ERYTHROCYTE [DISTWIDTH] IN BLOOD BY AUTOMATED COUNT: 13.8 % (ref 12.3–15.4)
GFR SERPLBLD CREATININE-BSD FMLA CKD-EPI: 106 ML/MIN/1.73
GFR SERPLBLD CREATININE-BSD FMLA CKD-EPI: 123 ML/MIN/1.73
GLOBULIN SER CALC-MCNC: 2.6 G/DL (ref 1.5–4.5)
GLUCOSE SERPL-MCNC: 88 MG/DL (ref 65–99)
HCT VFR BLD AUTO: 46.4 % (ref 37.5–51)
HDLC SERPL-MCNC: 38 MG/DL
HGB BLD-MCNC: 15.7 G/DL (ref 13–17.7)
IMM GRANULOCYTES # BLD: 0.1 X10E3/UL (ref 0–0.1)
IMM GRANULOCYTES NFR BLD: 1 %
INTERPRETATION, 910389: NORMAL
LDLC SERPL CALC-MCNC: 144 MG/DL (ref 0–99)
LYMPHOCYTES # BLD AUTO: 1.9 X10E3/UL (ref 0.7–3.1)
LYMPHOCYTES NFR BLD AUTO: 28 %
MCH RBC QN AUTO: 30 PG (ref 26.6–33)
MCHC RBC AUTO-ENTMCNC: 33.8 G/DL (ref 31.5–35.7)
MCV RBC AUTO: 89 FL (ref 79–97)
MONOCYTES # BLD AUTO: 0.4 X10E3/UL (ref 0.1–0.9)
MONOCYTES NFR BLD AUTO: 7 %
NEUTROPHILS # BLD AUTO: 3.9 X10E3/UL (ref 1.4–7)
NEUTROPHILS NFR BLD AUTO: 59 %
PLATELET # BLD AUTO: 208 X10E3/UL (ref 150–379)
POTASSIUM SERPL-SCNC: 4.4 MMOL/L (ref 3.5–5.2)
PROT SERPL-MCNC: 6.9 G/DL (ref 6–8.5)
RBC # BLD AUTO: 5.23 X10E6/UL (ref 4.14–5.8)
SODIUM SERPL-SCNC: 139 MMOL/L (ref 134–144)
TRIGL SERPL-MCNC: 200 MG/DL (ref 0–149)
TSH SERPL DL<=0.005 MIU/L-ACNC: 3.29 UIU/ML (ref 0.45–4.5)
VLDLC SERPL CALC-MCNC: 40 MG/DL (ref 5–40)
WBC # BLD AUTO: 6.6 X10E3/UL (ref 3.4–10.8)

## 2017-12-11 NOTE — PROGRESS NOTES
Please inform pt all labs normal side from cholesterol, was pt fully fasting? Labs done in hospital last month are much better than ones we lalitha suggesting pt had eaten something prior to test.  If not, work on diet and exercise and recheck 3 mo.    Thanks,  N

## 2019-02-06 ENCOUNTER — OFFICE VISIT (OUTPATIENT)
Dept: FAMILY MEDICINE CLINIC | Age: 39
End: 2019-02-06

## 2019-02-06 VITALS
WEIGHT: 245.38 LBS | HEART RATE: 91 BPM | DIASTOLIC BLOOD PRESSURE: 79 MMHG | BODY MASS INDEX: 33.23 KG/M2 | SYSTOLIC BLOOD PRESSURE: 109 MMHG | TEMPERATURE: 97.9 F | OXYGEN SATURATION: 97 % | HEIGHT: 72 IN | RESPIRATION RATE: 18 BRPM

## 2019-02-06 DIAGNOSIS — F25.9 SCHIZOAFFECTIVE DISORDER, UNSPECIFIED TYPE (HCC): ICD-10-CM

## 2019-02-06 DIAGNOSIS — Z00.00 ROUTINE GENERAL MEDICAL EXAMINATION AT A HEALTH CARE FACILITY: ICD-10-CM

## 2019-02-06 DIAGNOSIS — E78.00 PURE HYPERCHOLESTEROLEMIA: ICD-10-CM

## 2019-02-06 DIAGNOSIS — R06.02 SOB (SHORTNESS OF BREATH): Primary | ICD-10-CM

## 2019-02-06 NOTE — PROGRESS NOTES
1. Have you been to the ER, urgent care clinic since your last visit? Hospitalized since your last visit? No    2. Have you seen or consulted any other health care providers outside of the [address] [place] [address] since your last visit? Include any pap smears or colon screening.  No   Chief Complaint   Patient presents with    Complete Physical     - wants ears looked at   [facility]     labs- pt is fasting

## 2019-02-06 NOTE — PATIENT INSTRUCTIONS

## 2019-02-06 NOTE — PROGRESS NOTES
Brisa Dobbs is a 41 y.o. male presenting for their annual checkup. Follows a low fat diet?  no.  Dietary recall: 2 meals a day, lots of junk food, rare fruit and vegetables, drinks mostly soda or milk   Follow an exercise program?  no  Hours of sleep? 8 or more   Changes in bowel or bladder habits?  no  Up to date on Tdap (<10 years)? yes  Feels stable and well emotionally? Yes     Current concerns include: Pt states feels like ears are clogged. Has been doing OTC treatment for ear wax but still feeling like there is wax in there. Pt states has intermittent SOB and coughing. Does smoke 1/2 ppd, father asked him to get his breathing checked before he passed away. Pt would like to see Pulm. Past Medical History:   Diagnosis Date    Asthma     Depression     Schizoaffective disorder ([facility] [address]       Past Surgical History:   Procedure Laterality Date    HX APPENDECTOMY      HX WISDOM TEETH EXTRACTION        Prior to Admission medications    Medication Sig Start Date End Date Taking? Authorizing Provider   loratadine (CLARITIN) 10 mg tablet TAKE 1 TABLET BY MOUTH EVERY DAY 3/5/18  Yes Luis Simons NP   OLANZapine (ZYPREXA ZYDIS) 15 mg disintegrating tablet DISSOLVE 1 TABLET UNDER TONGUE TWICE A DAY 11/17/17  Yes Provider, Historical   fluPHENAZine decanoate (PROLIXIN) 25 mg/mL injection INJECT 2 ML INTRAMUSCULARLY ONCE EVERY TWO WEEKS 11/21/17  Yes Provider, Historical   baclofen (LIORESAL) 10 mg tablet Take 1 Tab by mouth three (3) times daily as needed. For muscle spasms 12/8/17  Yes Luis Simons NP   OLANZapine (ZYPREXA ZYDIS) 10 mg disintegrating tablet Take 1 Tab by mouth three (3) times daily.  Indications: Schizophrenia 11/2/17  Yes Ashely Silveira MD     Allergies   Allergen Reactions    Bee Sting [Sting, Bee] Swelling    Haldol [Haloperidol Lactate] Other (comments)     Muscle spasms    Pcn [Penicillins] Swelling    Shellfish Derived Other (comments)     Tingling in mouth Social History     Tobacco Use    Smoking status: Current Every Day Smoker     Packs/day: 0.50     Years: 15.00     Pack years: 7.50    Smokeless tobacco: Never Used    Tobacco comment: no response   Substance Use Topics    Alcohol use: Yes     Alcohol/week: 0.6 oz     Types: 1 Cans of beer per week      Family History   Problem Relation Age of Onset    Heart Disease Father     Hypertension Father     Stroke Father     Kidney Disease Father       Review of Systems -   Psychological ROS: negative  Ophthalmic ROS: negative  ENT ROS: negative  Endocrine ROS: negative  Breast ROS: negative  Respiratory ROS: no cough, shortness of breath, or wheezing  Cardiovascular ROS: no chest pain or dyspnea on exertion  Gastrointestinal ROS: no abdominal pain, change in bowel habits, or black or bloody stools  Genito-Urinary ROS: no dysuria, trouble voiding, or hematuria  Musculoskeletal ROS: negative  Neurological ROS: no TIA or stroke symptoms  Dermatological ROS: negative    Objective:  Visit Vitals  /79 (BP 1 Location: Left arm, BP Patient Position: Sitting)   Pulse 91   Temp 97.9 °F (36.6 °C) (Oral)   Resp 18   Ht 6' (1.829 m)   Wt 245 lb 6 oz (111.3 kg)   SpO2 97%   BMI 33.28 kg/m²     The physical exam is generally normal. He appears well, alert and oriented x 3, pleasant and cooperative. Vitals as noted. ENT normal, neck supple and free of adenopathy, or masses. No thyromegaly or carotid bruits. Cranial nerves and fundi normal. Lungs are clear to auscultation. Heart sounds are normal, no murmurs, clicks, gallops or rubs. Abdomen is soft, no tenderness, masses or organomegaly. Extremities, peripheral pulses and reflexes are normal. Screening neurological exam is normal without focal findings. Skin is normal without suspicious lesions. Assessment/Plan:  Diagnoses and all orders for this visit:    1.  Routine general medical examination at a health care facility  -     CBC WITH AUTOMATED DIFF  -     METABOLIC PANEL, COMPREHENSIVE  -     LIPID PANEL    2. SOB (shortness of breath)  -     CBC WITH AUTOMATED DIFF  -     REFERRAL TO PULMONARY DISEASE  Unclear etiology, probably related to smoking. 3. Pure hypercholesterolemia  -     METABOLIC PANEL, COMPREHENSIVE  -     LIPID PANEL  Discussed need for low fat diet, rich in fruits and vegetables. Encouraged regular meals and daily exercise of at least 20 minutes. Reviewed sequela of high cholesterol on heart and brain health. Continue current medications. F/U 3 mo. 4. [facility]oaffect[facility], unspecified type ([facility]  Stable, cont current plan of care. Discussed importance of healthy diet and regular exercise, recommended multivitamin and sunscreen usage. Encouraged monthly self breast/testicular exam.      Follow-up Disposition:  Return if symptoms worsen or fail to improve.     Ronda Sampson NP

## 2019-02-07 LAB
ALBUMIN SERPL-MCNC: 4.5 G/DL (ref 3.5–5.5)
ALBUMIN/GLOB SERPL: 1.7 {RATIO} (ref 1.2–2.2)
ALP SERPL-CCNC: 90 IU/L (ref 39–117)
ALT SERPL-CCNC: 72 IU/L (ref 0–44)
AST SERPL-CCNC: 56 IU/L (ref 0–40)
BASOPHILS # BLD AUTO: 0.1 X10E3/UL (ref 0–0.2)
BASOPHILS NFR BLD AUTO: 1 %
BILIRUB SERPL-MCNC: 0.3 MG/DL (ref 0–1.2)
BUN SERPL-MCNC: 13 MG/DL (ref 6–20)
BUN/CREAT SERPL: 14 (ref 9–20)
CALCIUM SERPL-MCNC: 9.2 MG/DL (ref 8.7–10.2)
CHLORIDE SERPL-SCNC: 105 MMOL/L (ref 96–106)
CHOLEST SERPL-MCNC: 210 MG/DL (ref 100–199)
CO2 SERPL-SCNC: 19 MMOL/L (ref 20–29)
CREAT SERPL-MCNC: 0.9 MG/DL (ref 0.76–1.27)
EOSINOPHIL # BLD AUTO: 0.1 X10E3/UL (ref 0–0.4)
EOSINOPHIL NFR BLD AUTO: 2 %
ERYTHROCYTE [DISTWIDTH] IN BLOOD BY AUTOMATED COUNT: 14.4 % (ref 12.3–15.4)
GLOBULIN SER CALC-MCNC: 2.6 G/DL (ref 1.5–4.5)
GLUCOSE SERPL-MCNC: 108 MG/DL (ref 65–99)
HCT VFR BLD AUTO: 46.7 % (ref 37.5–51)
HDLC SERPL-MCNC: 30 MG/DL
HGB BLD-MCNC: 15.9 G/DL (ref 13–17.7)
HIV 1+2 AB+HIV1 P24 AG SERPL QL IA: NON REACTIVE
IMM GRANULOCYTES # BLD AUTO: 0 X10E3/UL (ref 0–0.1)
IMM GRANULOCYTES NFR BLD AUTO: 1 %
INTERPRETATION, 910389: NORMAL
LDLC SERPL CALC-MCNC: 150 MG/DL (ref 0–99)
LYMPHOCYTES # BLD AUTO: 2.9 X10E3/UL (ref 0.7–3.1)
LYMPHOCYTES NFR BLD AUTO: 36 %
MCH RBC QN AUTO: 30 PG (ref 26.6–33)
MCHC RBC AUTO-ENTMCNC: 34 G/DL (ref 31.5–35.7)
MCV RBC AUTO: 88 FL (ref 79–97)
MONOCYTES # BLD AUTO: 0.4 X10E3/UL (ref 0.1–0.9)
MONOCYTES NFR BLD AUTO: 5 %
NEUTROPHILS # BLD AUTO: 4.4 X10E3/UL (ref 1.4–7)
NEUTROPHILS NFR BLD AUTO: 55 %
PLATELET # BLD AUTO: 233 X10E3/UL (ref 150–379)
POTASSIUM SERPL-SCNC: 4.4 MMOL/L (ref 3.5–5.2)
PROT SERPL-MCNC: 7.1 G/DL (ref 6–8.5)
RBC # BLD AUTO: 5.3 X10E6/UL (ref 4.14–5.8)
RPR SER QL: NON REACTIVE
SODIUM SERPL-SCNC: 141 MMOL/L (ref 134–144)
TRIGL SERPL-MCNC: 152 MG/DL (ref 0–149)
VLDLC SERPL CALC-MCNC: 30 MG/DL (ref 5–40)
WBC # BLD AUTO: 7.9 X10E3/UL (ref 3.4–10.8)

## 2019-02-07 NOTE — PROGRESS NOTES
Please also inform pt if calls back that lab ran HIV and RPR in error and he won't be charged but tests were negative just so he can know.    Thanks,  N

## 2019-02-07 NOTE — PROGRESS NOTES
Please inform pt labs show:   1. Impaired fasting glucose, will add on diabetes test at next appt. 2.  Slightly high liver enzymes, we will cont to monitor  3. High cholesterol - will send in rx to pharmacy and recheck fasting levels in 3 months. Please help him schedule F/U appt.    Thanks,  N

## 2019-02-07 NOTE — PROGRESS NOTES
Latricia Ochoa- we had cancelled the STD tests on pt and it appears they were still run. Just want to ensure he does not get charged. Let me know if there is anything I need to do.    Thanks,  N

## 2019-02-11 NOTE — PROGRESS NOTES
Called patient on mobile phone ([phone]) Patient states understanding of fasting lab results. Patient is willing to take cholesterol med when called to pharmacy. Pt declined to schedule f/u appointment but states will do so in 3 months.

## 2019-02-13 RX ORDER — ATORVASTATIN CALCIUM 20 MG/1
20 TABLET, FILM COATED ORAL DAILY
Qty: 30 TAB | Refills: 2 | Status: SHIPPED | OUTPATIENT
Start: 2019-02-13 | End: 2019-06-12 | Stop reason: SDUPTHER

## 2019-06-13 RX ORDER — ATORVASTATIN CALCIUM 20 MG/1
20 TABLET, FILM COATED ORAL DAILY
Qty: 30 TAB | Refills: 2 | Status: SHIPPED | OUTPATIENT
Start: 2019-06-13

## 2021-04-15 NOTE — BH NOTES
--Patient refused A 1 C lab work for 10/13/17. Stated he was not giving blood. CM submitted the client's SPBP renewal application  no

## 2022-03-19 PROBLEM — R46.89 AGGRESSION: Status: ACTIVE | Noted: 2017-10-12

## 2025-07-14 ENCOUNTER — HOSPITAL ENCOUNTER (EMERGENCY)
Facility: HOSPITAL | Age: 45
Discharge: HOME | End: 2025-07-14
Attending: STUDENT IN AN ORGANIZED HEALTH CARE EDUCATION/TRAINING PROGRAM
Payer: COMMERCIAL

## 2025-07-14 VITALS
BODY MASS INDEX: 33.18 KG/M2 | OXYGEN SATURATION: 95 % | DIASTOLIC BLOOD PRESSURE: 84 MMHG | HEART RATE: 80 BPM | TEMPERATURE: 98.4 F | RESPIRATION RATE: 17 BRPM | WEIGHT: 245 LBS | SYSTOLIC BLOOD PRESSURE: 148 MMHG | HEIGHT: 72 IN

## 2025-07-14 DIAGNOSIS — R07.89 OTHER CHEST PAIN: Primary | ICD-10-CM

## 2025-07-14 LAB
ALBUMIN SERPL-MCNC: 4 G/DL (ref 3.5–5)
ALBUMIN/GLOB SERPL: 1.3 (ref 1.1–2.2)
ALP SERPL-CCNC: 83 U/L (ref 45–117)
ALT SERPL-CCNC: 108 U/L (ref 12–78)
ANION GAP SERPL CALC-SCNC: 9 MMOL/L (ref 2–12)
AST SERPL W P-5'-P-CCNC: 89 U/L (ref 15–37)
BASOPHILS # BLD: 0.08 K/UL (ref 0–0.1)
BASOPHILS NFR BLD: 1.3 % (ref 0–1)
BILIRUB SERPL-MCNC: 0.3 MG/DL (ref 0.2–1)
BUN SERPL-MCNC: 10 MG/DL (ref 6–20)
BUN/CREAT SERPL: 8 (ref 12–20)
CA-I BLD-MCNC: 9.1 MG/DL (ref 8.5–10.1)
CHLORIDE SERPL-SCNC: 113 MMOL/L (ref 97–108)
CO2 SERPL-SCNC: 23 MMOL/L (ref 21–32)
CREAT SERPL-MCNC: 1.31 MG/DL (ref 0.7–1.3)
DIFFERENTIAL METHOD BLD: ABNORMAL
EOSINOPHIL # BLD: 0.16 K/UL (ref 0–0.4)
EOSINOPHIL NFR BLD: 2.6 % (ref 0–7)
ERYTHROCYTE [DISTWIDTH] IN BLOOD BY AUTOMATED COUNT: 13.2 % (ref 11.5–14.5)
GLOBULIN SER CALC-MCNC: 3.2 G/DL (ref 2–4)
GLUCOSE SERPL-MCNC: 107 MG/DL (ref 65–100)
HCT VFR BLD AUTO: 42.4 % (ref 36.6–50.3)
HGB BLD-MCNC: 14.4 G/DL (ref 12.1–17)
IMM GRANULOCYTES # BLD AUTO: 0.04 K/UL (ref 0–0.04)
IMM GRANULOCYTES NFR BLD AUTO: 0.7 % (ref 0–0.5)
LYMPHOCYTES # BLD: 2.24 K/UL (ref 0.8–3.5)
LYMPHOCYTES NFR BLD: 37 % (ref 12–49)
MCH RBC QN AUTO: 28 PG (ref 26–34)
MCHC RBC AUTO-ENTMCNC: 34 G/DL (ref 30–36.5)
MCV RBC AUTO: 82.5 FL (ref 80–99)
MONOCYTES # BLD: 0.66 K/UL (ref 0–1)
MONOCYTES NFR BLD: 10.9 % (ref 5–13)
NEUTS SEG # BLD: 2.88 K/UL (ref 1.8–8)
NEUTS SEG NFR BLD: 47.5 % (ref 32–75)
NRBC # BLD: 0 K/UL (ref 0–0.01)
NRBC BLD-RTO: 0 PER 100 WBC
PLATELET # BLD AUTO: 233 K/UL (ref 150–400)
PMV BLD AUTO: 11.4 FL (ref 8.9–12.9)
POTASSIUM SERPL-SCNC: 3.8 MMOL/L (ref 3.5–5.1)
PROT SERPL-MCNC: 7.2 G/DL (ref 6.4–8.2)
RBC # BLD AUTO: 5.14 M/UL (ref 4.1–5.7)
SODIUM SERPL-SCNC: 145 MMOL/L (ref 136–145)
TROPONIN I SERPL HS-MCNC: 5 NG/L (ref 0–76)
WBC # BLD AUTO: 6.1 K/UL (ref 4.1–11.1)

## 2025-07-14 PROCEDURE — 96374 THER/PROPH/DIAG INJ IV PUSH: CPT

## 2025-07-14 PROCEDURE — 6360000002 HC RX W HCPCS: Performed by: STUDENT IN AN ORGANIZED HEALTH CARE EDUCATION/TRAINING PROGRAM

## 2025-07-14 PROCEDURE — 80053 COMPREHEN METABOLIC PANEL: CPT

## 2025-07-14 PROCEDURE — 93005 ELECTROCARDIOGRAM TRACING: CPT | Performed by: STUDENT IN AN ORGANIZED HEALTH CARE EDUCATION/TRAINING PROGRAM

## 2025-07-14 PROCEDURE — 36415 COLL VENOUS BLD VENIPUNCTURE: CPT

## 2025-07-14 PROCEDURE — 84484 ASSAY OF TROPONIN QUANT: CPT

## 2025-07-14 PROCEDURE — 85025 COMPLETE CBC W/AUTO DIFF WBC: CPT

## 2025-07-14 PROCEDURE — 99284 EMERGENCY DEPT VISIT MOD MDM: CPT

## 2025-07-14 RX ORDER — KETOROLAC TROMETHAMINE 15 MG/ML
15 INJECTION, SOLUTION INTRAMUSCULAR; INTRAVENOUS ONCE
Status: DISCONTINUED | OUTPATIENT
Start: 2025-07-14 | End: 2025-07-14

## 2025-07-14 RX ORDER — KETOROLAC TROMETHAMINE 15 MG/ML
15 INJECTION, SOLUTION INTRAMUSCULAR; INTRAVENOUS ONCE
Status: COMPLETED | OUTPATIENT
Start: 2025-07-14 | End: 2025-07-14

## 2025-07-14 RX ADMIN — KETOROLAC TROMETHAMINE 15 MG: 15 INJECTION, SOLUTION INTRAMUSCULAR; INTRAVENOUS at 22:07

## 2025-07-14 ASSESSMENT — HEART SCORE: ECG: NORMAL

## 2025-07-14 ASSESSMENT — PAIN DESCRIPTION - LOCATION: LOCATION: CHEST

## 2025-07-14 ASSESSMENT — PAIN - FUNCTIONAL ASSESSMENT: PAIN_FUNCTIONAL_ASSESSMENT: 0-10

## 2025-07-14 ASSESSMENT — LIFESTYLE VARIABLES
HOW OFTEN DO YOU HAVE A DRINK CONTAINING ALCOHOL: NEVER
HOW MANY STANDARD DRINKS CONTAINING ALCOHOL DO YOU HAVE ON A TYPICAL DAY: PATIENT DOES NOT DRINK

## 2025-07-14 ASSESSMENT — PAIN SCALES - GENERAL: PAINLEVEL_OUTOF10: 3

## 2025-07-14 ASSESSMENT — PAIN DESCRIPTION - DESCRIPTORS: DESCRIPTORS: PRESSURE

## 2025-07-14 ASSESSMENT — PAIN DESCRIPTION - ORIENTATION: ORIENTATION: RIGHT;LEFT;MID

## 2025-07-14 NOTE — ED TRIAGE NOTES
Reports chest discomfort that started this morning, is across center of chest, describes it as \"pressure.\" Reports SOB as well.    Cardiologist is dr pizano.    The patient is a 4y5m Male complaining of difficulty breathing.

## 2025-07-15 NOTE — DISCHARGE INSTRUCTIONS
nurse.     Make an appointment with your family doctor or the physician you were referred to for follow-up of this visit as instructed on your discharge paperwork, as this is a mandatory follow-up. Return to the ER if you are unable to be seen or if you are unable to be seen in a timely manner.    If you have any problem arranging the follow-up visit, contact the Emergency Department immediately.

## 2025-07-15 NOTE — ED PROVIDER NOTES
EMERGENCY DEPARTMENT HISTORY AND PHYSICAL EXAM      Patient Name: Abrahan Lopez  MRN: [id number]  YOB: 1980  Date of evaluation: 7/14/2025  Provider: Freddie Posada MD     History of Present Illness     Chief Complaint   Patient presents with    Chest Pain       History Provided By: Patient    HPI: Abrahan Lopez, 45 y.o. male with past medical history as listed and reviewed below has additional history of Brugada.  Who presents to the ED with complaint of left-sided chest pain, intermittent since he woke up today at 7 AM, last episode occurred about 2 PM.  Comes under the left and chest that is sharp, intermittent, nonradiating, no associated symptoms such as nausea vomiting shortness of breath diaphoresis.  Does not have any radiation to the back or abdomen.  Does not have any left arm symptoms or numbness or tingling.  Does have a history of cigarette use, does not currently smoke, does not currently drink or use drugs.  Does have a history of hypertension and high cholesterol.  Has not had any trauma, no history of pulmonary embolism, no hormone use, no leg swelling.    Medical History     Past Medical History:  Past Medical History:   Diagnosis Date    Anxiety     Asthma     Depression     Schizoaffective disorder (HCC)        Past Surgical History:  History reviewed. No pertinent surgical history.    Family History:  History reviewed. No pertinent family history.    Social History:       Allergies:  Allergies   Allergen Reactions    Haldol [Haloperidol]      Hand spasms    Penicillins Other (See Comments)     Unknown reaction    Shellfish-Derived Products Angioedema       PCP: Johanny Walsh APRN - NP    Current Medications:   No current facility-administered medications for this encounter.     No current outpatient medications on file.       Social Determinants of Health:   Social Drivers of Health     Tobacco Use: Medium Risk (5/6/2025)    Received from [facility]    Patient

## 2025-07-16 LAB
EKG ATRIAL RATE: 89 BPM
EKG DIAGNOSIS: NORMAL
EKG P AXIS: 42 DEGREES
EKG P-R INTERVAL: 148 MS
EKG Q-T INTERVAL: 396 MS
EKG QRS DURATION: 98 MS
EKG QTC CALCULATION (BAZETT): 481 MS
EKG R AXIS: -38 DEGREES
EKG T AXIS: 52 DEGREES
EKG VENTRICULAR RATE: 89 BPM